# Patient Record
Sex: FEMALE | Race: WHITE
[De-identification: names, ages, dates, MRNs, and addresses within clinical notes are randomized per-mention and may not be internally consistent; named-entity substitution may affect disease eponyms.]

---

## 2017-01-07 ENCOUNTER — HOSPITAL ENCOUNTER (INPATIENT)
Dept: HOSPITAL 53 - M ED | Age: 52
LOS: 2 days | Discharge: HOME | DRG: 313 | End: 2017-01-09
Attending: INTERNAL MEDICINE | Admitting: HOSPITALIST
Payer: MEDICARE

## 2017-01-07 VITALS — BODY MASS INDEX: 55.32 KG/M2 | WEIGHT: 293 LBS | HEIGHT: 61 IN

## 2017-01-07 VITALS — SYSTOLIC BLOOD PRESSURE: 94 MMHG | DIASTOLIC BLOOD PRESSURE: 50 MMHG

## 2017-01-07 DIAGNOSIS — G62.9: ICD-10-CM

## 2017-01-07 DIAGNOSIS — Z88.1: ICD-10-CM

## 2017-01-07 DIAGNOSIS — Z95.2: ICD-10-CM

## 2017-01-07 DIAGNOSIS — N18.4: ICD-10-CM

## 2017-01-07 DIAGNOSIS — E11.9: ICD-10-CM

## 2017-01-07 DIAGNOSIS — Z88.6: ICD-10-CM

## 2017-01-07 DIAGNOSIS — Z79.02: ICD-10-CM

## 2017-01-07 DIAGNOSIS — I13.0: ICD-10-CM

## 2017-01-07 DIAGNOSIS — Z95.3: ICD-10-CM

## 2017-01-07 DIAGNOSIS — Z95.5: ICD-10-CM

## 2017-01-07 DIAGNOSIS — Z88.8: ICD-10-CM

## 2017-01-07 DIAGNOSIS — K58.2: ICD-10-CM

## 2017-01-07 DIAGNOSIS — Z85.42: ICD-10-CM

## 2017-01-07 DIAGNOSIS — G25.81: ICD-10-CM

## 2017-01-07 DIAGNOSIS — Z90.710: ICD-10-CM

## 2017-01-07 DIAGNOSIS — F41.9: ICD-10-CM

## 2017-01-07 DIAGNOSIS — Z79.899: ICD-10-CM

## 2017-01-07 DIAGNOSIS — R07.2: Primary | ICD-10-CM

## 2017-01-07 DIAGNOSIS — Z79.82: ICD-10-CM

## 2017-01-07 DIAGNOSIS — J30.9: ICD-10-CM

## 2017-01-07 DIAGNOSIS — I44.0: ICD-10-CM

## 2017-01-07 DIAGNOSIS — I50.32: ICD-10-CM

## 2017-01-07 DIAGNOSIS — G47.33: ICD-10-CM

## 2017-01-07 DIAGNOSIS — Z87.891: ICD-10-CM

## 2017-01-07 DIAGNOSIS — I42.2: ICD-10-CM

## 2017-01-07 DIAGNOSIS — Z91.19: ICD-10-CM

## 2017-01-07 DIAGNOSIS — E03.9: ICD-10-CM

## 2017-01-07 DIAGNOSIS — K43.9: ICD-10-CM

## 2017-01-07 DIAGNOSIS — E66.01: ICD-10-CM

## 2017-01-07 DIAGNOSIS — F32.9: ICD-10-CM

## 2017-01-07 LAB
ALBUMIN SERPL BCG-MCNC: 3 GM/DL (ref 3.2–5.2)
ALBUMIN/GLOB SERPL: 0.83 {RATIO} (ref 1–1.93)
ALP SERPL-CCNC: 96 U/L (ref 45–117)
ALT SERPL W P-5'-P-CCNC: 21 U/L (ref 12–78)
ANION GAP SERPL CALC-SCNC: 10 MEQ/L (ref 8–16)
AST SERPL-CCNC: 20 U/L (ref 15–37)
BASOPHILS # BLD AUTO: 0.1 K/MM3 (ref 0–0.2)
BASOPHILS NFR BLD AUTO: 0.7 % (ref 0–1)
BILIRUB CONJ SERPL-MCNC: 0.2 MG/DL (ref 0–0.2)
BILIRUB SERPL-MCNC: 0.6 MG/DL (ref 0.2–1)
BUN SERPL-MCNC: 40 MG/DL (ref 7–18)
CALCIUM SERPL-MCNC: 8.9 MG/DL (ref 8.5–10.1)
CHLORIDE SERPL-SCNC: 109 MEQ/L (ref 98–107)
CO2 SERPL-SCNC: 26 MEQ/L (ref 21–32)
CREAT SERPL-MCNC: 2.31 MG/DL (ref 0.55–1.02)
EOSINOPHIL # BLD AUTO: 0.2 K/MM3 (ref 0–0.5)
EOSINOPHIL NFR BLD AUTO: 1.5 % (ref 0–3)
ERYTHROCYTE [DISTWIDTH] IN BLOOD BY AUTOMATED COUNT: 14.3 % (ref 11.5–14.5)
GFR SERPL CREATININE-BSD FRML MDRD: 23.6 ML/MIN/{1.73_M2} (ref 51–?)
GLUCOSE SERPL-MCNC: 144 MG/DL (ref 70–105)
INR PPP: 1.14
LARGE UNSTAINED CELL #: 0.2 K/MM3 (ref 0–0.4)
LARGE UNSTAINED CELL %: 2 % (ref 0–4)
LYMPHOCYTES # BLD AUTO: 2.6 K/MM3 (ref 1.5–4.5)
LYMPHOCYTES NFR BLD AUTO: 25.6 % (ref 24–44)
MAGNESIUM SERPL-MCNC: 2 MG/DL (ref 1.8–2.4)
MCH RBC QN AUTO: 32.2 PG (ref 27–33)
MCHC RBC AUTO-ENTMCNC: 33.4 G/DL (ref 32–36.5)
MCV RBC AUTO: 96.3 FL (ref 80–96)
MONOCYTES # BLD AUTO: 0.6 K/MM3 (ref 0–0.8)
MONOCYTES NFR BLD AUTO: 6 % (ref 0–5)
NEUTROPHILS # BLD AUTO: 6.1 K/MM3 (ref 1.8–7.7)
NEUTROPHILS NFR BLD AUTO: 64.1 % (ref 36–66)
PLATELET # BLD AUTO: 158 K/MM3 (ref 150–450)
POTASSIUM SERPL-SCNC: 4.4 MEQ/L (ref 3.5–5.1)
PROT SERPL-MCNC: 6.6 GM/DL (ref 6.4–8.2)
SODIUM SERPL-SCNC: 145 MEQ/L (ref 136–145)
WBC # BLD AUTO: 9.5 K/MM3 (ref 4–10)

## 2017-01-07 RX ADMIN — ISOSORBIDE MONONITRATE SCH MG: 60 TABLET, EXTENDED RELEASE ORAL at 09:00

## 2017-01-07 RX ADMIN — MULTIPLE VITAMINS W/ MINERALS TAB SCH TAB: TAB at 09:00

## 2017-01-07 RX ADMIN — FAMOTIDINE SCH MG: 20 TABLET, FILM COATED ORAL at 20:43

## 2017-01-07 RX ADMIN — METOPROLOL SUCCINATE SCH MG: 25 TABLET, EXTENDED RELEASE ORAL at 20:24

## 2017-01-07 RX ADMIN — CLOPIDOGREL BISULFATE SCH MG: 75 TABLET ORAL at 09:00

## 2017-01-07 RX ADMIN — RANOLAZINE SCH MG: 500 TABLET, FILM COATED, EXTENDED RELEASE ORAL at 20:43

## 2017-01-07 RX ADMIN — ASPIRIN SCH MG: 81 TABLET ORAL at 09:00

## 2017-01-07 RX ADMIN — FUROSEMIDE SCH MG: 20 TABLET ORAL at 09:00

## 2017-01-07 RX ADMIN — SODIUM CHLORIDE SCH UNITS: 4.5 INJECTION, SOLUTION INTRAVENOUS at 21:03

## 2017-01-07 RX ADMIN — DOCUSATE SODIUM SCH MG: 100 CAPSULE, LIQUID FILLED ORAL at 20:43

## 2017-01-07 RX ADMIN — ONDANSETRON PRN MG: 2 INJECTION INTRAMUSCULAR; INTRAVENOUS at 23:11

## 2017-01-07 RX ADMIN — METOPROLOL SUCCINATE SCH MG: 25 TABLET, EXTENDED RELEASE ORAL at 20:41

## 2017-01-07 RX ADMIN — PREGABALIN SCH MG: 50 CAPSULE ORAL at 20:43

## 2017-01-07 NOTE — HPE
DATE OF ADMISSION:  01/07/2017

 

Time patient was seen was at 12:00 p.m.

 

PRIMARY CARE PHYSICIAN:  Dr. Do at [facility], [place], [place]

 

CARDIOLOGIST:  Dr. Collier

 

NEPHROLOGIST:  Dr. Matt

 

CHIEF COMPLAINT:  Chest pain radiating into the left arm and jaw.

 

HISTORY OF PRESENT ILLNESS:  51-year-old female with past medical history of

cardiomyopathy status post septal myectomy and bioprosthetic mitral valve

replacement.  Also, diastolic heart failure grade II, ejection fraction 60%,

hypothyroidism, restless leg syndrome, type 2 diabetes not on any medication,

endometrial cancer status post hysterectomy, subxiphoid ventral hernia,

obstructive sleep apnea noncompliant with CPAP, and irritable bowel syndrome who

presented with left sided chest pain radiating to left arm and the left jaw.  
Per

patient, it started three weeks ago and it was needle-like and can last for a

whole day.  However, no associated trouble breathing and not associated with

exertion.  Per patient, is better with pain medication and resolves around 24

hours and is worse with nitroglycerin.  She has seen her primary care for the

past few weeks and primary care believes that patient's pain is due to anxiety.

This Wednesday, about two days ago, she called cardiology and cardiology would

like her to get work up at [facility] at [place], [place].  However, she

stated that after the work up, the work up was never sent to Dr. Collier.  Last

night, the pain was worse, therefore, the patient presented herself to our

emergency room.  En route to the hospital, she was also received nitroglycerin

times two without any relief.  She also received five times of by mouth fentanyl

50 mcg.  The patient stated that that helped her and she also received Zofran 4

mg.  Otherwise, the patient denies any fever, chills, or any abdominal pain.

Admits to nausea.  Denies vomiting.  Denies any diarrhea or constipation

currently.  Denies any blood in urine or stool or any problem with urination.

 

ALLERGIES:  Patient is allergic to DOXYCYCLINE which gives her hives, KEFLEX

which gives her rash, LIPITOR gives her hives, MORPHINE per patient it turns her

blood vessel green, and her doctor told her never to take morphine again, by

mouth morphine or IV morphine she should avoid per patient, NEXIUM gives her

hives, DORMITOL makes her tongue swell.

 

HOME MEDICATIONS:

-  aspirin 81 mg one tablet by mouth daily

-  cetirizine 10 mg one tablet by mouth daily as needed

-  Plavix 75 mg one tablet by mouth daily

-  Pepcid 20 mg one tablet by mouth twice a day

-  fish oil 1000 mg one tablet by mouth daily

-  Prozac 40 mg one tablet by mouth daily

-  folic acid 800 mcg one tablet by mouth daily

-  Lasix 20 mg one tablet by mouth daily

-  isosorbide mononitrate ER 60 mg one tablet by mouth daily

-  Synthroid 25 mcg one tablet by mouth daily

-  Ativan 1 mg one tablet by mouth three times a day as needed

-  metoprolol succinate 12.5 mg one tablet by mouth twice a day

-  multivitamin one tablet by mouth daily

-  Nitrostat 0.4 mg sublingual as needed every 5 minutes

-  Lyrica 50 mg one tablet by mouth twice a day

-  Ranexa 1000 mg one tablet by mouth twice a day

-  Requip 2 mg one tablet by mouth at bedtime

 

PAST MEDICAL HISTORY:

1.  Cardiomyopathy status post two septal myectomies, also mitral valve

replacement times two, the first time was mechanical, the most recent one was

replaced with bioprosthetic in 2013.

2.  Coronary artery disease, status post two stent placement, last one was in

2016.

3.  Diastolic heart failure, ejection fraction 60-65%.

4.  Hypertensive heart disease.

5.  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

which was complicated with a mitral valve replacement.

6.  Hypothyroidism.

7.  Restless leg syndrome.

8.  Seasonal allergies.

9.  Type 2 diabetes, no longer needing medication.

10.  Endometrial cancer status post hysterectomy.

11.  Depression and anxiety.

12.  Subxiphoid ventral hernia.

13.  Obstructive sleep apnea noncompliant with CPAP for years.

14.  Irritable bowel syndrome.

15.  First degree AV block.

16.  Elevated troponin at baseline.

17.  Chronic kidney disease Stage IV.

 

PAST SURGICAL HISTORY:

1.  Mitral valve replacement times two, most recent one was bioprosthetic in

2013.

2.  Posterior tibial tendon transplant.

3.  Septal myomectomy times two.

4.  Adenoidectomy and tonsillectomy.

5.  Hysterectomy due to endometrial cancer in 2013.

6.  Cardiac stent times two, last one in 2016.

7.  Tracheostomy.

8.  Hemorrhoid repair.

 

SOCIAL HISTORY:  Patient lives at home with her parents and her son.  Denies any

smoking.  Quit nine years ago.  Smoked one pack per day for at least 20 years

prior.  Denies any drinking or recreational drug use.

 

FAMILY HISTORY:  Denies.  Patient was adopted.

 

REVIEW OF SYSTEMS:

GENERAL:  Patient denies any recent weight changes, any sick contacts, recent

traveling, fever or chills.

HEENT:  Denies any changes with vision, hearing, taste, any trouble swallowing,

or any cough.

CARDIOVASCULAR:  Patient has cardiomyopathy and coronary artery disease status

post multiple procedures.  Follows with Dr. Collier.  Admits to intermittent chest

pain for at least three weeks and also has right sided chest pain when patient

has anxiety attacks; however, this time it is on the left side.

PULMONARY:  Denies any trouble breathing.  Denies any oxygen at home.  Denies 
any

sleeping on a recliner or use of multiple pillows.  Denies any congestive heart

failure, however, she does have grade 2 diastolic heart dysfunction.

GI:  Denies any abdominal pains.  Admits to nausea.  Denies any vomiting or any

diarrhea or any constipation.  Patient however does have intermittent diarrhea

and constipation at baseline.  Denies any blood in the stool.  Patient states

that she has irritable bowel syndrome.

:  Denies any problem with urination or any blood in the urine or burning on

urination.

MUSCULOSKELETAL:  Denies any pain anywhere besides her chest currently.

ENDOCRINE:  Patient does have hypothyroidism.  Patient used to have type 2

diabetes which resolved with diet control.  Denies any heat or cold intolerance.

HEMATOLOGY/ONCOLOGY:  Denies any ease of bruising or any bleeding anywhere.

Patient did have a bleed per rectum in the past, however, resolved after

hemorrhoid surgery.  Otherwise patient also had endometrial cancer, status post

hysterectomy three years ago.

PSYCHIATRIC:  Patient has both anxiety and depression and anxiety seems to 
happen

rather frequently and gives her right sided chest pain.

NEUROLOGIC:  Denies any changes with vision, smell, hearing or taste or any

change of sensations or weakness on any one side of her body.

 

PHYSICAL EXAMINATION:

VITAL SIGNS:  Blood pressure 130/61, pulse 68, respirations 20, temperature 97.5
,

oxygen satting at 96% on room air.  Weight was 127 kg.  Height 154 cm.

GENERAL:  Patient is a morbidly obese middle aged female who was alert, awake,

oriented times three.  Does not appear to be in distress, lying comfortably in

bed with head elevated at 30 degrees.

HEENT:  Normocephalic, atraumatic.  Extraocular muscles intact.  Mucous 
membranes

moist.  Neck supple.  No lymphadenopathy.

CARDIOVASCULAR:  Regular rate and rhythm.  S1, S2.  Difficult to auscultate due

to body habitus.

LUNGS:  Clear to auscultate bilaterally.  No wheeze, rales or rhonchi.

ABDOMEN:  Positive bowel sounds.  Soft.  Nontender.  Nondistended.  No 
peritoneal

signs.  No ecchymosis.  Patient does have a ventral hernia at the subxiphoid

region; however, it does not bulge and is not tender to palpation.

EXTREMITIES:  No edema, clubbing or cyanosis.

NEUROLOGIC:  Cranial nerves II-XII intact.  No focal neurological deficit.

 

LABS:  WBC 9.5, hemoglobin 13.7, hematocrit 40.9 with platelet count of 158 and

MCV of 96.3.

 

Sodium 145, potassium 4.4, chloride 109, bicarbonate 26, BUN 40, creatinine 2.31
,

GFR 23.6, fasting glucose 144, calcium 8.9, CK 74, CK-MB 2, troponin at 2:00 
a.m.

last night was 0.16 and this morning at 8:00 a.m. was 0.16 again.  PT 14.7 and

INR 1.14.  PTT 24.1.

 

Patient had a two view chest x-ray in the emergency room, PA and lateral, which

shows mild cardiomegaly, otherwise no acute disease.

 

Patient's CT of abdomen and pelvis and also CT of chest have been ordered,

results pending.  Will followup.

 

Patient's EKG this morning shows sinus rhythm with first degree AV block and 
left

ventricular rate at 7:54 this morning was 67.

 

ASSESSMENT AND PLAN:  51-year-old female with past medical history of

cardiomyopathy status post septal myomectomy and also mitral valve replacement,

also coronary artery disease status post stent placement, morbid obesity,

hypothyroidism, hypertension, depression, and other comorbidities who presented

with:

 

1.  Chest pain that was needle-like and radiating to the left arm and jaw with

elevated troponin also.  Troponin was 0.16.  Cardiology, Dr. Sanches, was

consulted from the emergency room.  We appreciate his help.  At this point, the

patient also received an echocardiogram while patient was in the emergency room.

It was read by Dr. Sanches and it shows a second degree diastolic dysfunction 
with

ejection fraction of 60%.  At this point, will continue to trend the patient's

cardiac markers.  Rule out acute coronary syndrome (ACS).  Patient's EKG does

show first degree AV block and also left bundle branch block.  In addition, 
after

I had spoken to Dr. Sanches, he believes the patient also had an episode of

passing out, possibly has arrhythmia.  Therefore, will monitor patient on 
cardiac

telemetry and continue to monitor the patient.  Repeat EKG in the morning.

2.  EKG shows first degree AV block and left bundle branch block.  Will continue

to monitor.

3.  Elevated troponin at 0.16 times two.  Will continue to trend the cardiac

markers three more times and to see a trend to decrease.

4.  Chronic kidney disease type IV with a creatinine of 2.31 and GFR of 23.6.

Last time she was here in July, GFR was 19.  Will continue to monitor patient.

So far, patient does not have any urinary complaints.  Continue small dose of

Lasix 20 mg by mouth daily.

5.  Soft blood pressure in the emergency room with one episode of blood pressure

92/49.  Will repeat orthostatic blood pressure overnight and continue to monitor

patient.

6.  Diastolic heart failure.  Patient has an echocardiogram done this morning

read by Dr. Sanches.  Patient has an ejection fraction of 60% with grade 2

diastolic dysfunction.

7.  Hypertension.  Continue metoprolol 12.5 mg twice a day and also Imdur 60 mg

daily.  Hold if systolic blood pressure is less than 120 or if heart rate is 
less

than 65.

8.  Cardiomyopathy, status post septal myomectomy and also mitral valve

replacement with bioprosthetic valve.  Continue to monitor.  Echocardiogram this

morning did not show any abnormalities.

9.  Hypothyroidism.  Continue Synthroid.

10.  Type 2 diabetes.  Not on any medication, was diet controlled.  Continue

carbohydrate consistent diet.  Will do finger sticks in the evening and continue

to monitor patient.

11.  Restless leg syndrome.  Continue Requip.

12.  Seasonal allergies.  Continue antihistamine.

13.  Morbid obesity, stable.  Continue to monitor.

14.  Obstructive sleep apnea, noncompliant with CPAP.  Will provide as needed

oxygen at night.

15.  History of endometrial cancer status post hysterectomy.  Denies any lumps,

easy bruising or any bleeding.

16.  Anxiety and depression.  Continue home medication Prozac.

17.  Ventral hernia, subxiphoid.  Possibly causing patient's symptoms.  Will

obtain CT of abdomen and pelvis.  At the same time, will obtain CT of chest for

patient's chest pain.

18.  Irritable bowel syndrome.  Continue Colace.

19.  Deep vein thrombosis prophylaxis with heparin 5000 units subcutaneously

every 8 hours.

 

DISPOSITION:  Will continue to monitor patient on cardiac telemetry.  Continue 
to

trend troponin.  Will continue to follow with Dr. Sanches, cardiology,

recommendations.  Also, will followup with the result from CT of chest and

abdomen and pelvis.  Will repeat EKG in the morning.

 

The patient has been discussed with attending doctor, Dr. Green.

 

My preceptor for this patient encounter was Dr. Marly Green.  The preceptor was

physically present in the building during the encounter and was fully available.

As needed, all aspects of the patient interview, examination, medical decision

making process, and medical care plan development were reviewed and approved by

the preceptor.  The preceptor is aware and concurs with the plan as stated in 
the

body of this note and will attest to such by his/her cosignature.



I have both independently examined this patient as well as reviewed the note. I 
have discussed in detail with the resident the findings and plan of treatment 
as documented in the residents note. I will continue to follow the patient and 
offer further guidance to the patients care as necessary during this hospital 
stay. 



Marly WILKINS

## 2017-01-07 NOTE — EDDOCDS
Nurse's Notes                                                                                     

[facility]                                                                         

Name: Ruth Degroot                                                                           

Age: 51 yrs                                                                                       

Sex: Female                                                                                       

: 1965                                                                                   

MRN: [id number]                                                                                     

Arrival Date: 2017                                                                          

Time: 01:47                                                                                       

Account#: [id number]                                                                              

Bed Admit Hold                                                                                    

Private MD:                                                                                       

Diagnosis: Chest pain, unspecified                                                                

                                                                                                  

Presentation:                                                                                     

                                                                                             

01:56 Presenting complaint: EMS states: Chest pain radiating down left arm and into jaw for   kmg1

      approx 1.5 hours. Took Nitro without relief. Aspirin was taken PTA. Adult Sepsis            

      Screening: The patient does not have new or worsening altered mentation. Patient's          

      respiratory rate is less than 22. Systolic blood pressure is greater than 100. Patient      

      has a qSOFA score of 0- Negative Sepsis Screen. Suicide/Homicide risk assessment- the       

      patient denies having any suicidal and/or homicidal ideations and does not present with     

      any other emotional, behavioral or mental health complaints.  Status: Patient       

      is not a  or  dependent. Transition of care: patient was not          

      received from another setting of care. Care prior to arrival: See EMS report.               

      Medications administered prior to arrival: ASA, NTG.                                        

01:56 Acuity: BEE Level 2                                                                     km

01:56 Method Of Ar[place]: [place]                                                            [facility] [place]

                                                                                                  

Triage Assessment:                                                                                

02:09 General: Appears in no apparent distress, comfortable, obese, Behavior is appropriate   [facility] [place]

      for [place], cooperative, pleasant. Pain: Location: anterior aspect of left upper chest         

      Pain currently is 9 out of 10 on a pain scale. Pain radiates to left arm and neck           

      Quality of pain is described as sharp. HIV screening NA for this visit Offered              

      previously. The patient is triaged at the bedside. See Assessment in Nurses Notes           

      section of ED record. Cardiovascular: Capillary refill < 3 seconds Heart tones S1 S2        

      present Rhythm is Left BBB Chest pain is described as severe, quality is stabbing, is       

      located in left anterior radiates to left arm(s) neck episodes are continuous began 2       

      hours prior to arrival Chest pain began 2 hours ago. Respiratory: Airway is patent          

      Respiratory effort is even, unlabored, Respiratory pattern is regular, symmetrical,         

      Breath sounds are clear bilaterally. Denies shortness of breath. GI: Reports nausea.        

      Derm: Skin is pink, warm & dry.                                                           

                                                                                                  

OB/GYN:                                                                                           

02:09 LMP N/A - Hysterectomy                                                                  kmg1

                                                                                                  

Historical:                                                                                       

- Allergies: Doxycycline; Keflex (Rash); Lipitor; Morphine; Nexium; Donnatal (tongue swelling);   

- Home Meds:                                                                                      

1. aspirin 81 mg Oral chew 1 tab once daily                                                     

     (Last dose: 2017)                                                                      

2. Ativan 1 mg Oral tab 1 tab 3 times per day as needed                                         

     (Last dose: 2017 00:30)                                                                

3. Fish Oil 1,000 mg Oral cap                                                                   

     (Last dose: 2017)                                                                      

4. folic acid 800 mcg Oral tab 1 tab once daily                                                 

     (Last dose: 2017)                                                                      

5. isosorbide mononitrate 30 mg Oral Tb24 1 tab once daily                                      

     (Last dose: 2017)                                                                      

6. Lasix 20 mg Oral tab 1 tab once daily                                                        

     (Last dose: 2017)                                                                      

7. levothyroxine 25 mcg Oral tab 1 tab once daily                                               

     (Last dose: 2017)                                                                      

8. Lyrica 50 mg Oral 1 cap twice a day                                                          

     (Last dose: 2017)                                                                      

9. metoprolol succinate 25 mg Tb24 .5 tab twice a day                                           

     (Last dose: 2017)                                                                      

10. multivitamin Oral tab 1 tab daily                                                           

     (Last dose: 2017)                                                                      

11. nitroglycerin 0.4 mg SL subl 1 tab also had one by ems en route                             

     (Last dose: 2017 00:35)                                                                

12. Pepcid 20 mg Oral tab 1 tab twice a day                                                     

     (Last dose: 2017)                                                                      

13. Plavix 75 mg Oral tab 1 tab once daily                                                      

     (Last dose: 2017)                                                                      

14. Prozac 40 mg Oral cap 1 cap once daily                                                      

     (Last dose: 2017)                                                                      

15. Ranexa 1,000 mg oral Tb12 1 tab 2 times per day                                             

     (Last dose: 2017)                                                                      

16. Requip 1 mg Oral tab 1 tab nightly 1-2 tabs at HS as needed                                 

17. Zyrtec 10 mg Oral tab 1 tab once daily                                                      

     (Last dose: 2017)                                                                      

- PMHx: Anxiety; CHF; Depression; GERD; Hypercholesterolemia; Hypertension;                       

Hypothyroidism; restless leg syndrome; Seasonal Allergies; CRF; Endometrial Cancer;             

- PSHx: Valve Replacement; Post Tibial Transplant; Septomyectomy; Adenoidectomy;                  

Tonsillectomy; Hysterectomy; Cardiac stents; Tracheostomy;                                      

- Social history: Smoking status: Patient states former smoker of tobacco. No barriers            

to communication noted, The patient speaks fluent English, Speaks appropriately for             

age.                                                                                            

- Family history: No immediate family members are acutely ill.                                    

- : The pt / caregiver states he / she is on anticoagulants: Plavix. Home medication              

list is obtained from the patient, [employer] import data.                                         

- Exposure Risk Screening:: None identified.                                                      

                                                                                                  

                                                                                                  

Screenin:00 Screening information is obtained from the patient. Fall risk: No risks identified.     kmg1

      Assistance ADL's: requires no assistance with activities of daily living. Abuse/DV          

      Screen: The patient / caregiver reports he/she is: not in a situation that causes fear,     

      pain or injury. Nutritional screening: No deficits noted. Advance Directives:               

      Currently, there is a health care proxy, Pedro Singh (son). There is no active DNR order.     

      home support is adequate.                                                                   

                                                                                                  

Assessment:                                                                                       

02:00 General: See Triage Assessment.                                                         kmg1

03:00 General: Appears in no apparent distress, comfortable, Behavior is appropriate for age, kmg1

      cooperative, pleasant. Pain: Location: neck and left arm and anterior aspect of left        

      upper chest Pain currently is 3 out of 10 on a pain scale. Cardiovascular:.                 

      Cardiovascular: Rhythm is sinus arrythmia. Respiratory: Airway is patent Respiratory        

      effort is even, unlabored, Respiratory pattern is regular, symmetrical.                     

04:00 Reassessment: Patient appears in no apparent distress at this time. Patient states      kmg1

      feeling better. Patient states symptoms have improved. Resting quietly .                    

04:58 General: Appears in no apparent distress, comfortable, Behavior is appropriate for age, kmg1

      cooperative, pleasant. Pain: Location: anterior aspect of left upper chest Pain             

      currently is 7 out of 10 on a pain scale. Quality of pain is described as stabbing.         

05:59 Reassessment: Patient appears in no apparent distress at this time. Patient states      kmg1

      feeling better. Patient states symptoms have improved.                                      

07:49 General: Pt lying on stretcher. States that chest pain has recurred and feels like a    jc4 

      sharp pain in the left side of her chest and radiates into her left neck and left arm.      

      Color pink, skin warm and dry. Respirations easy and full. Dr. Ivory made aware.          

09:06 General: Pt resting on stretcher. Appears comfortable. Color pink, skin warm and dry.   jc4 

      Respirations easy and full. States that pain is improved and is currently "4/10" at         

      this time. Call bell in reach.                                                              

09:29 General: Appears in no apparent distress, comfortable, Behavior is appropriate for age, dsf 

      cooperative. Pain: Location: chest Pain currently is 7 out of 10 on a pain scale. Pain      

      does not radiate. Quality of pain is described as sharp, stabbing. Neurological: Level      

      of Consciousness is awake, alert, Oriented to person, place, time. Cardiovascular:          

      Capillary refill < 3 seconds Heart tones S1 S2 present Rhythm is sinus rhythm with 1st      

      degree heart block. Respiratory: Airway is patent Respiratory effort is even,               

      unlabored, Respiratory pattern is regular, symmetrical, Breath sounds are clear             

      bilaterally. GI: Abdomen is obese, Bowel sounds present X 4 quads. Abd is soft and non      

      tender X 4 quads. Derm: Skin is pink, warm & dry.                                         

10:28 General: Lili performing echocardiogram on patient .                                  dsf 

11:28 Adult Sepsis Screening: The patient does not have new or worsening altered mentation.   dsf 

      Patient's respiratory rate is less than 22. Systolic blood pressure is greater than         

      100. Patient has a qSOFA score of 0- Negative Sepsis Screen. General: Appears in no         

      apparent distress, comfortable, Behavior is appropriate for age, cooperative. Pain:         

      Location: chest Pain currently is 2 out of 10 on a pain scale. Quality of pain is           

      described as sharp, stabbing. Neurological: Level of Consciousness is awake, alert.         

      Cardiovascular: Capillary refill < 3 seconds Rhythm is sinus rhythm with 1st degree         

      heart block. Respiratory: Airway is patent Respiratory effort is even, unlabored,           

      Respiratory pattern is regular, symmetrical. Derm: Skin is pink, warm & dry.              

12:28 General: Appears in no apparent distress, Behavior is appropriate for age, cooperative. dsf 

      Neurological: Level of Consciousness is awake, alert. Cardiovascular: Capillary refill      

      < 3 seconds Rhythm is sinus rhythm with 1st degree heart block. Respiratory: Airway is      

      patent Respiratory effort is even, unlabored, Respiratory pattern is regular,               

      symmetrical. Derm: Skin is pink, warm & dry.                                              

12:52 General: Dr. Sanches in room examining patient .                                         dsf 

13:30 Adult Sepsis Screening: The patient does not have new or worsening altered mentation.   dsf 

      Patient's respiratory rate is less than 22. Systolic blood pressure is greater than         

      100. Patient has a qSOFA score of 0- Negative Sepsis Screen. General: Appears in no         

      apparent distress, Behavior is appropriate for age, cooperative. Neurological: Level of     

      Consciousness is awake, alert. Cardiovascular: Capillary refill < 3 seconds.                

      Respiratory: Airway is patent Respiratory effort is even, unlabored, Respiratory            

      pattern is regular, symmetrical. Derm: Skin is pink, warm & dry.                          

14:04 General: pt sitting up eating a lunch tray.                                             dsf 

14:30 General: Appears in no apparent distress, comfortable, Behavior is appropriate for age, dsf 

      cooperative, pleasant. Neurological: Level of Consciousness is awake, alert.                

      Cardiovascular: Capillary refill < 3 seconds Rhythm is sinus rhythm with 1st degree         

      heart block. Respiratory: Airway is patent Respiratory effort is even, unlabored,           

      Respiratory pattern is regular, symmetrical. Derm: Skin is pink, warm & dry.              

15:03 General: Appears in no apparent distress, comfortable, Behavior is appropriate for age, dsf 

      cooperative. Pain: Location: chest Pain currently is 6 out of 10 on a pain scale.           

      Quality of pain is described as sharp, stabbing. Neurological: Level of Consciousness       

      is awake, alert. Cardiovascular: Capillary refill < 3 seconds. Respiratory: Airway is       

      patent Respiratory effort is even, unlabored, Respiratory pattern is regular,               

      symmetrical. Derm: Skin is pink, warm & dry.                                              

16:03 General: Appears in no apparent distress, Behavior is appropriate for age, cooperative. dsf 

      Neurological: Level of Consciousness is awake, alert. Cardiovascular: Capillary refill      

      < 3 seconds. Respiratory: Airway is patent Respiratory effort is even, unlabored,           

      Respiratory pattern is regular, symmetrical. Derm: Skin is pink, warm & dry.              

17:03 Adult Sepsis Screening: The patient does not have new or worsening altered mentation.   dsf 

      Patient's respiratory rate is less than 22. Systolic blood pressure is greater than         

      100. Patient has a qSOFA score of 0- Negative Sepsis Screen. General: Appears in no         

      apparent distress, Behavior is appropriate for age, cooperative. Neurological: Level of     

      Consciousness is awake, alert. Cardiovascular: Capillary refill < 3 seconds.                

      Respiratory: Airway is patent Respiratory effort is even, unlabored, Respiratory            

      pattern is regular, symmetrical. Derm: Skin is pink, warm & dry.                          

18:03 General: Appears in no apparent distress, Behavior is appropriate for age, cooperative. dsf 

      Neurological: Level of Consciousness is awake, alert. Cardiovascular: Capillary refill      

      < 3 seconds Rhythm is sinus rhythm with 1st degree heart block. Respiratory: Airway is      

      patent Respiratory effort is even, unlabored, Respiratory pattern is regular,               

      symmetrical. Derm: Skin is pink, warm & dry.                                              

19:29 General: Appears in no apparent distress, comfortable, Behavior is cooperative. Pain:   lf1 

      Location: anterior aspect of left upper chest Pain currently is 5 out of 10 on a pain       

      scale. Pain radiates to neck and left arm. Neurological: Level of Consciousness is          

      awake, alert, Oriented to person, place, time. EENT: No deficits noted. Respiratory:        

      Respiratory effort is even, unlabored. GI: Abdomen is obese, Denies nausea, vomiting.       

      Derm: Skin is normal. Injury Description: No known injury.                                  

20:00 General:. General: Appears in no apparent distress, Behavior is cooperative.            lf1 

      Neurological: Level of Consciousness is awake, alert.                                       

                                                                                                  

Vital Signs:                                                                                      

01:57  / 71; Pulse 76; Resp 20; Temp 98.5(O); Pulse Ox 97% on R/A; Weight 127.01 kg;    ls3 

      Height 5 ft. 1 in. (154.94 cm); Pain 9/10;                                                  

05:55  / 66 (auto/);                                                                    jc4 

05:55 Pulse 70 MON; Pulse Ox 98% ;                                                            jc4 

06:10  / 85 (auto/);                                                                    jc4 

06:10 Pulse 70 MON; Pulse Ox 95% ;                                                            jc4 

06:25  / 71 (auto/);                                                                    jc4 

06:25 Pulse 70 MON; Pulse Ox 96% ;                                                            jc4 

06:40  / 75 (auto/);                                                                    jc4 

06:40 Pulse 70 MON; Pulse Ox 95% ;                                                            jc4 

06:55  / 74 (auto/);                                                                    jc4 

06:55 Pulse 70 MON; Pulse Ox 94% ;                                                            jc4 

07:10  / 77 (auto/);                                                                    jc4 

07:10 Pulse 70 MON; Pulse Ox 95% ;                                                            jc4 

07:23 Pulse 76 MON; Pulse Ox 99% ;                                                            jc4 

07:25  / 70 (auto/);                                                                    jc4 

07:40  / 61 (auto/);                                                                    jc4 

07:40 Pulse 70 MON; Pulse Ox 97% ;                                                            jc4 

07:46  / 57 (auto/);                                                                    jc4 

07:46 Pulse 68 MON; Pulse Ox 96% ;                                                            jc4 

07:49  / 57; Pulse 69; Resp 20; Pulse Ox 97% on R/A;                                    jc4 

07:55 BP 92 / 49 (auto/);                                                                     jc4 

07:55 Pulse 70 MON; Pulse Ox 97% ;                                                            jc4 

08:00 Pulse 66 MON; Pulse Ox 96% ;                                                            jc4 

08:00  / 60 (auto/);                                                                    jc4 

08:09 Pulse 68 MON; Pulse Ox 97% ;                                                            jc4 

08:10  / 65 (auto/);                                                                    jc4 

08:39 Pulse 72 MON; Pulse Ox 95% ;                                                            jc4 

08:40  / 59 (auto/);                                                                    jc4 

08:54 Pulse 70 MON; Pulse Ox 95% ;                                                            jc4 

08:55  / 75 (auto/);                                                                    jc4 

09:05 Pain 4/10;                                                                              jc4 

09:10  / 63 (auto/);                                                                    dsf 

09:10 Pulse 70 MON; Pulse Ox 93% ;                                                            dsf 

09:25  / 60 (auto/);                                                                    dsf 

09:25 Pulse 70 MON; Resp 20; Temp 97.5(TE); Pulse Ox 93% on R/A; Pain 7/10;                   dsf 

09:40  / 63 (auto/);                                                                    dsf 

09:40 Pulse 68 MON; Pulse Ox 95% ;                                                            dsf 

09:55  / 72 (auto/);                                                                    dsf 

09:55 Pulse 62 MON; Pulse Ox 93% ;                                                            dsf 

10:10  / 68 (auto/);                                                                    dsf 

10:10 Pulse 58 MON; Pulse Ox 94% ;                                                            dsf 

10:25  / 61 (auto/);                                                                    dsf 

10:25 Pulse 68 MON; Pulse Ox 96% ;                                                            dsf 

11:07 Pain 7/10;                                                                              dsf 

11:32 Pain 2/10;                                                                              dsf 

11:40  / 55 (auto/);                                                                    dsf 

11:40 Pulse 66 MON; Pulse Ox 95% ;                                                            dsf 

11:55  / 62 (auto/);                                                                    dsf 

11:55 Pulse 66 MON; Pulse Ox 96% ;                                                            dsf 

12:10  / 59 (auto/);                                                                    dsf 

12:10 Pulse 66 MON; Pulse Ox 96% ;                                                            dsf 

12:25  / 60 (auto/);                                                                    dsf 

12:25 Pulse 64 MON; Pulse Ox 96% ;                                                            dsf 

12:40  / 57 (auto/);                                                                    dsf 

12:40 Pulse 68 MON; Pulse Ox 95% ;                                                            dsf 

12:55  / 73 (auto/);                                                                    dsf 

12:55 Pulse 68 MON; Pulse Ox 94% ;                                                            dsf 

13:10 Pulse 68 MON; Pulse Ox 96% ;                                                            dsf 

13:10  / 74 (auto/); Resp 20; Temp 97.6(TE); Pain 210;                                 dsf 

13:25  / 87 (auto/);                                                                    dsf 

13:25 Pulse 66 MON; Pulse Ox 93% ;                                                            dsf 

13:40  / 62 (auto/);                                                                    dsf 

13:40 Pulse 68 MON; Pulse Ox 95% ;                                                            dsf 

13:55  / 73 (auto/);                                                                    dsf 

13:55 Pulse 70 MON; Pulse Ox 97% ;                                                            dsf 

14:25  / 67 (auto/);                                                                    dsf 

14:26 Pulse 82 MON; Pulse Ox 94% ;                                                            dsf 

16:52  / 91 (auto/);                                                                    dsf 

16:52 Pulse 76 MON; Pulse Ox 94% ;                                                            dsf 

17:07  / 77 (auto/);                                                                    dsf 

17:07 Pulse 76 MON; Pulse Ox 94% ;                                                            dsf 

17:22  / 86 (auto/);                                                                    dsf 

17:22 Pulse 78 MON; Resp 20; Temp 97.8(TE); Pulse Ox 95% on R/A; Pain 110;                   dsf 

17:36 Pulse 78 MON; Pulse Ox 94% ;                                                            lf1 

17:37  / 100 (auto/);                                                                   lf1 

17:51 Pulse 80 MON; Pulse Ox 95% ;                                                            lf1 

17:52  / 99 (auto/);                                                                    lf1 

18:05 Pulse 78 MON; Pulse Ox 94% ;                                                            lf1 

18:07  / 65 (auto/);                                                                    lf1 

18:21 Pulse 80 MON; Pulse Ox 95% ;                                                            lf1 

18:22  / 64 (auto/);                                                                    lf1 

18:47 Pulse 78 MON; Pulse Ox 94% ;                                                            lf1 

18:48  / 56 (auto/);                                                                    lf1 

18:52  / 56 (auto/);                                                                    lf1 

18:52 Pulse 78 MON; Pulse Ox 94% ;                                                            lf1 

19:07  / 70 (auto/);                                                                    lf1 

19:07 Pulse 80 MON; Pulse Ox 96% ;                                                            lf1 

19:22  / 66 (auto/);                                                                    lf1 

19:22 Pulse 82 MON; Resp 18; Temp 97.5(O); Pulse Ox 94% ; Pain 5/10;                          lf1 

19:52  / 81 (auto/);                                                                    lf1 

19:52 Pulse 86 MON; Resp 18; Pulse Ox 95% ; Pain 5/10;                                        lf1 

01:57 Body Mass Index 52.91 (127.01 kg, 154.94 cm)                                            ls3 

                                                                                                  

Vitals:                                                                                           

02:00 Refer to monitor trend for complete vital signs trends.                                 kmg1

02:09 Log In Time N/A - ambulance arrival.                                                    kmg1

                                                                                                  

ED Course:                                                                                        

01:49 Patient visited by Lopresti, Mary-Elizabeth, Unit Clerk.                                ml3 

01:49 Ana Jacome, RN is Primary Nurse.                                                   ml3 

01:49 Patient moved to Waiting                                                                ml3 

01:49 Patient moved to 17                                                                     ml3 

01:58 Triage Initiated                                                                        kmg1

02:00 The patient / caregiver is instructed regarding the plan of care and ED course. Cardiac kmg1

      monitor on. Pulse ox on. NIBP on.                                                           

02:05 EKG done. (by ED staff). Reviewed by iGllian Ivory DO.                                kb5 

02:06 Patient visited by Bancroft, Kristopher, PCA.                                           kb5 

02:15 Gillian Ivory DO is Attending Physician.                                            mm11

02:15 Patient visited by Gillian Ivory DO.                                                mm11

02:24 Patient visited by Gillian Ivory DO.                                                mm11

02:46 Inserted saline lock: 20 gauge in left forearm.                                         kmg1

02:58 Patient visited by Gillian Ivory DO.                                                mm11

03:40 Patient visited by Gillian Ivory DO.                                                mm11

04:59 Patient visited by Ana Jacome, LYN.                                                 kmg1

05:59 Patient visited by Ana Jacome, LYN.                                                 kmg1

07:17 Patient visited by Gillian Ivory DO.                                                mm11

07:24 [facility] Payment Agreement was scanned into [employer] and attached to record.               [id number]

07:29 Primary Nurse role handed off by Ana Jacome, RN                                    js13

07:54 EKG done. (by ED staff). Reviewed by Gillian Ivory DO.                                ct3 

08:01 Patient visited by Hale, Rajni, PCA.                                              ct3 

08:03 CARDIAC MARKER PANEL Sent.                                                              jc4 

08:11 Attending Physician role handed off by Gillian Ivory DO                             ml  

08:11 Lundborg-Gray, Maja, MD is Attending Physician.                                         ml  

08:41 Chest, 2 View (pa\E\lat) Returned.                                                        EDMS

09:02 Patient visited by Rajni Hale PCA.                                              ct3 

09:05 Patient visited by Siobhan Leonard RN.                                                jc4 

09:12 EKG-ADULT Returned.                                                                     EDMS

09:15 Chest, 2 View (pa\E\lat) Returned.                                                        EDMS

09:30 Patient visited by Archana Zabala RN.                                                   dsf 

10:09 Patient visited by Rajni Hale PCA.                                              ct3 

10:28 Patient visited by Archana Zabala RN.                                                   dsf 

11:30 Patient visited by Rajni Hale PCA.                                              ct3 

11:38 Peter Marly is Hospitalizing Provider.                                                      ml  

11:44 Patient visited by Archana Zabala RN.                                                   dsf 

12:46 Patient visited by Archana Zabala RN.                                                   dsf 

13:45 Patient visited by Rajni Hale PCA.                                              ct3 

13:45 Diet: consistent diet given to patient.                                                 ct3 

14:05 Patient visited by Archana Zabala RN.                                                   dsf 

14:09 Patient moved to Admit Hold                                                             kpj 

14:30 Patient visited by Archana Zabala RN.                                                   dsf 

15:04 Patient visited by Archana Zabala RN.                                                   dsf 

16:10 Patient visited by Archana Zabala RN.                                                   dsf 

16:29 CT Chest without contrast Returned.                                                     EDMS

16:29 CT Abdomen without contrast Returned.                                                   EDMS

18:29 Patient visited by Archana Zabala RN.                                                   dsf 

19:05 Patient visited by Bancroft, Kristopher, PCA.                                           kb5 

19:29 Patient visited by Migdalia West RN.                                                        lf1 

19:52 No procedures done that require assistance.                                             lf1 

                                                                                                  

Administered Medications:                                                                         

02:46 Drug: fentaNYL (PF) 50 mcg [fentanyl (PF) 50 mcg/mL injection solution (1 mL)] Route:   kmg1

      IVP; Site: left forearm;                                                                    

05:06 Drug: fentaNYL (PF) 50 mcg [fentanyl (PF) 50 mcg/mL injection solution (1 mL)] Route:   kmg1

      IVP; Site: left forearm;                                                                    

07:57 Drug: Ondansetron 4 mg [ondansetron HCl 2 mg/mL intravenous solution (2 mL)] Route:     jc4 

      IVP; Site: left antecubital;                                                                

08:12 Drug: fentaNYL (PF) 50 mcg [fentanyl (PF) 50 mcg/mL injection solution (1 mL)] Route:   jc4 

      IVP; Site: left antecubital;                                                                

09:05 Follow up: Pain 4/10 Adult                                                              jc4 

10:26 Drug: fentaNYL (PF) 50 mcg [fentanyl (PF) 50 mcg/mL injection solution (1 mL)] Route:   dsf 

      IVP; Site: left antecubital;                                                                

11:07 Follow up: Pain 7/10 Adult; see trend VS                                                dsf 

11:11 Drug: fentaNYL (PF) 50 mcg [fentanyl (PF) 50 mcg/mL injection solution (1 mL)] Route:   dsf 

      IVP; Site: left antecubital;                                                                

11:32 Follow up: Pain 2/10 Adult; see trend VS                                                dsf 

15:00 Drug: oxyCODONE-acetaminophen 1 tabs [oxycodone-acetaminophen 5 mg-325 mg tablet (1     dsf 

      tabs)] Route: PO;                                                                           

                                                                                                  

                                                                                                  

Intake:                                                                                           

                                                                                                  

Output:                                                                                           

14:39 Urine: 150.00ml (Voided); Total: 150.00ml.                                              dsf 

                                                                                                  

Order Results:                                                                                    

Lab Order: Basic Metabolic Profile; SPEC'M 17 02:27                                         

      Test: GLUCOSE, FASTING; Value: 144; Range: ; Abnormal: Above high normal; Units:      

      MG/DL; Status: F                                                                            

      Test: BLOOD UREA NITROGEN; Value: 40; Range: 7-18; Abnormal: Above high normal; Units:      

      MG/DL; Status: F                                                                            

      Test: CREATININE FOR GFR; Value: 2.31; Range: 0.55-1.02; Abnormal: Above high normal;       

      Units: MG/DL; Status: F                                                                     

      Test: GLOMERULAR FILTRATION RATE; Value: 23.6; Range: >51; Abnormal: Below low normal;      

      Status: F                                                                                   

      Test: SODIUM LEVEL; Value: 145; Range: 136-145; Units: MEQ/L; Status: F                     

      Test: POTASSIUM SERUM; Value: 4.4; Range: 3.5-5.1; Units: MEQ/L; Status: F                  

      Test: CHLORIDE LEVEL; Value: 109; Range: ; Abnormal: Above high normal; Units:        

      MEQ/L; Status: F                                                                            

      Test: CARBON DIOXIDE LEVEL; Value: 26; Range: 21-32; Units: MEQ/L; Status: F                

      Test: ANION GAP; Value: 10; Range: 8-16; Units: MEQ/L; Status: F                            

      Test: CALCIUM LEVEL; Value: 8.9; Range: 8.5-10.1; Units: MG/DL; Status: F                   

      Test Note: &nbsp;; Units are mL/min/1.73 m2 Chronic Kidney Disease Staging per NKF:       

      Stage I & II GFR >=60 Normal to Mildly Decreased Stage III GFR 30-59 Moderately           

      Decreased Stage IV GFR 15-29 Severely Decreased Stage V GFR <15 Very Little GFR Left        

      ESRD GFR <15 on RRT                                                                         

Lab Order: CBC with Diff; SPEC'M 17 02:27                                                   

      Test: WHITE BLOOD COUNT; Value: 9.5; Range: 4.0-10.0; Units: K/mm3; Status: F               

      Test: RED BLOOD COUNT; Value: 4.25; Range: 4.00-5.40; Units: M/mm3; Status: F               

      Test: HEMOGLOBIN; Value: 13.7; Range: 12.0-16.0; Units: g/dl; Status: F                     

      Test: HEMATOCRIT; Value: 40.9; Range: 36.0-47.0; Units: %; Status: F                        

      Test: MEAN CORPUSCULAR VOLUME; Value: 96.3; Range: 80.0-96.0; Abnormal: Above high          

      normal; Units: fl; Status: F                                                                

      Test: MEAN CORPUSCULAR HEMOGLOBIN; Value: 32.2; Range: 27.0-33.0; Units: pg; Status: F      

      Test: MEAN CORPUSCULAR HGB CONC; Value: 33.4; Range: 32.0-36.5; Units: g/dl; Status: F      

      Test: RED CELL DISTRIBUTION WIDTH; Value: 14.3; Range: 11.5-14.5; Units: %; Status: F       

      Test: PLATELET COUNT, AUTOMATED; Value: 158; Range: 150-450; Units: k/mm3; Status: F        

      Test: NEUTROPHILS %; Value: 64.1; Range: 36.0-66.0; Units: %; Status: F                     

      Test: LYMPH %; Value: 25.6; Range: 24.0-44.0; Units: %; Status: F                           

      Test: MONO %; Value: 6.0; Range: 0.0-5.0; Abnormal: Above high normal; Units: %;            

      Status: F                                                                                   

      Test: EOS %; Value: 1.5; Range: 0.0-3.0; Units: %; Status: F                                

      Test: BASO %; Value: 0.7; Range: 0.0-1.0; Units: %; Status: F                               

      Test: LARGE UNSTAINED CELL %; Value: 2.0; Range: 0.0-4.0; Units: %; Status: F               

      Test: NEUTROPHILS #; Value: 6.1; Range: 1.8-7.7; Units: K/mm3; Status: F                    

      Test: LYMPH #; Value: 2.6; Range: 1.5-4.5; Units: K/mm3; Status: F                          

      Test: MONO #; Value: 0.6; Range: 0.0-0.8; Units: K/mm3; Status: F                           

      Test: EOS #; Value: 0.2; Range: 0.0-0.50; Units: K/mm3; Status: F                           

      Test: BASO #; Value: 0.1; Range: 0.0-0.2; Units: K/mm3; Status: F                           

      Test: LARGE UNSTAINED CELL #; Value: 0.2; Range: 0.0-0.4; Units: K/mm3; Status: F           

Lab Order: Cardiac Injury Profile; SPEC'M 17 02:27                                          

      Test: CPK CREATINE PHOSPHOKINASE; Value: 74; Range: ; Units: U/L; Status: F           

      Test: CK-MB VALUE MASS; Value: 2.0; Range: 0.0-3.6; Units: NG/ML; Status: F                 

      Test: MB/CK RELATIVE INDEX; Value: 2.70; Range: < OR =4; Status: F                          

      Test Note: &nbsp;; DIAGNOSIS CRITERIA MMB ng/ml Relative Index (RI) NON-AMI < or = 5      

      N/A GRAY ZONE > 5 < or = 4 AMI > 5 > 4                                                      

Lab Order: Partial Thromboplastin Time; SPEC'M 17 02:27                                     

      Test: PARTIAL THROMBOPLASTIN TIME; Value: 24.1; Range: 26.6-37.1; Abnormal: Below low       

      normal; Units: SECONDS; Status: F                                                           

Lab Order: Prothrombin Time Profile\E\INR; SPEC'M 17 02:27                                  

      Test: PROTHROMBIN TIME; Value: 14.7; Range: 12.3-14.5; Abnormal: Above high normal;         

      Units: SECONDS; Status: F                                                                   

      Test: INR; Value: 1.14; Status: F                                                           

      Test Note: &nbsp;; THERAPUTIC HUMAN INR VALUES INDICATIONS NORMAL RANGES                  

      PROPHYLAXIS/TREATMENT OF: VENOUS THROMBOSIS 2.0-3.0 PULMONARY EMBOLISM 2.0-3.0              

      PREVENTION OF SYSTEMIC EMBOLISM FROM: TISSUE HEART VALVES 2.0-3.0 ACUTE MYOCARDIAL          

      INFARCTION 2.0-3.0 VALVULAR HEART DISEASE 2.0-3.0 ATRIAL FIBRILLATION 2.0-3.0               

      MECHANICAL VALVES(HIGH RISK) 2.5-3.5 RECURRENT MYOCARDIAL INFARCTION 2.5-3.5                

Lab Order: Troponin; SPEC'M 17 02:27                                                        

      Test: TROPONIN I; Value: 0.16; Range: < 0.10; Abnormal: Above high normal; Units:           

      NG/ML; Status: F                                                                            

      Test Note: &nbsp;; Troponin I Reference Interval for [employer] [place] LOCI: 99th             

      Percentile= 0.00-0.045 ng/ml Risk Stratification: <= 0.10 ng/ml Decreased Risk for          

      Adverse Clinical Events. 0.10-1.50 ng/ml Increased Risk for Adverse Clinical Events.        

      Evaluation of additional criterion and/or repeat testing in 2-6 hours is suggested to       

      rule out myocardial damage. >= 1.50 ng/ml Indicative of Myocardial Injury.                  

Lab Order: CARDIAC MARKER PANEL; SPEC'M 17 08:01                                            

      Test: CPK CREATINE PHOSPHOKINASE; Value: 59; Range: ; Units: U/L; Status: F           

      Test: CK-MB VALUE MASS; Value: 2.0; Range: 0.0-3.6; Units: NG/ML; Status: F                 

      Test: MB/CK RELATIVE INDEX; Value: 3.38; Range: < OR =4; Status: F                          

      Test: TROPONIN I; Value: 0.16; Range: < 0.10; Abnormal: Above high normal; Units:           

      NG/ML; Status: F                                                                            

      Test Note: &nbsp;; DIAGNOSIS CRITERIA MMB ng/ml Relative Index (RI) NON-AMI < or = 5      

      N/A GRAY ZONE > 5 < or = 4 AMI > 5 > 4                                                      

Lab Order: CARDIAC MARKER PANEL; SPEC 17 14:21                                            

      Test: CPK CREATINE PHOSPHOKINASE; Value: 59; Range: ; Units: U/L; Status: F           

      Test: CK-MB VALUE MASS; Value: 2.5; Range: 0.0-3.6; Units: NG/ML; Status: F                 

      Test: MB/CK RELATIVE INDEX; Value: 4.23; Range: < OR =4; Abnormal: Above high normal;       

      Status: F                                                                                   

      Test: TROPONIN I; Value: 0.15; Range: < 0.10; Abnormal: Above high normal; Units:           

      NG/ML; Status: F                                                                            

      Test Note: &nbsp;; DIAGNOSIS CRITERIA MMB ng/ml Relative Index (RI) NON-AMI < or = 5      

      N/A GRAY ZONE > 5 < or = 4 AMI > 5 > 4                                                      

Lab Order: MAGNESIUM LEVEL; SPEC 17 14:21                                                 

      Test: MAGNESIUM LEVEL; Value: 2.0; Range: 1.8-2.4; Units: MG/DL; Status: F                  

Lab Order: LIVER PROFILE; SPEC 17 14:21                                                   

      Test: AST/SGOT; Value: 20; Range: 15-37; Units: U/L; Status: F                              

      Test: ALT/SGPT; Value: 21; Range: 12-78; Units: U/L; Status: F                              

      Test: ALKALINE PHOSPHATASE; Value: 96; Range: ; Units: U/L; Status: F                 

      Test: BILIRUBIN,TOTAL; Value: 0.6; Range: 0.2-1.0; Units: MG/DL; Status: F                  

      Test: BILIRUBIN,DIRECT; Value: 0.2; Range: 0.0-0.2; Units: MG/DL; Status: F                 

      Test: TOTAL PROTEIN; Value: 6.6; Range: 6.4-8.2; Units: GM/DL; Status: F                    

      Test: ALBUMIN; Value: 3.0; Range: 3.2-5.2; Abnormal: Below low normal; Units: GM/DL;        

      Status: F                                                                                   

      Test: ALBUMIN/GLOBULIN RATIO; Value: 0.83; Range: 1.00-1.93; Abnormal: Below low            

      normal; Status: F                                                                           

                                                                                                  

Radiology Order: EKG-ADULT                                                                        

      Test: EKG-ADULT                                                                             

      REASON FOR EXAMINATION: Chest Pain; Stationary ECG Study; [facility] - ED; ;        

      Test Date: 2017; Pat Name: RUTH DEGROOT Department:; Patient ID: [id number]        

      Room: -; Gender: F Technician: QUEENIE; : 1965 Requested By: GILLIAN JUÁREZ;         

      Order Number: [id number] Reading MD: Aletha Lizama; Measurements;         

      Intervals Axis; Rate: 73 P: 21; UT: 217 QRS: -12; QRSD: 165 T: 111; QT: 473; QTc: 524;      

      Interpretive Statements; SINUS RHYTHM WITH FIRST DEGREE AV BLOCK; LEFT BUNDLE BRANCH        

      BLOCK; NO PRIOR FOR COMPARISON; Electronically Signed On 2017 8:43:43 EST by Aletha Lizama;                                                                            

Radiology Order: Chest, 2 View (pa\E\lat)                                                         

      Test: Chest, 2 View (pa\E\lat)                                                              

      REASON FOR EXAMINATION: Chest Pain; Chest x-ray: Two views.; ; History: Chest pain. No      

      comparison views.; ; Findings: The patient is status post prior median sternotomy. EKG      

      monitoring; electrodes overlie the chest. The heart is mildly enlarged. Pulmonary;          

      vasculature is not increased. Pleural angles are sharp. No significant bony;                

      abnormality is seen.; ; Impression:; ; Mild cardiomegaly. Otherwise no acute disease.;      

      ; ; Signed by; Ric Dowell MD 2017 08:47 A;                                          

Radiology Order: CT Chest without contrast                                                        

      Test: CT Chest without contrast                                                             

      REASON FOR EXAMINATION: Chest pain on left side; CT of the study of the chest without       

      contrast:; ; History: Left-sided chest pain.; ; Findings: The patient is status post        

      prior median sternotomy. There is an; epigastric ventral hernia containing a portion of     

      the left lobe of the liver.; This is seen to protrude through a defect in the upper         

      abdominal subxyphoid wall; 6.6 cm in craniocaudal by 6.5 cm in medial to lateral span.;     

      ; There is coronary stent graft and vascular calcification. A mitral valvular;              

      prosthesis is seen. Mitral annular calcification is noted. No mediastinal or; hilar         

      mass or adenopathy is observed. There is no evidence of pleural or; pericardial             

      effusion. Minimal fibrotic changes are noted in the lungs. Bone; window settings show       

      no bony destructive lesion. There are clips in the; gallbladder post cholecystectomy.       

      No adrenal lesion is observed.; ; Impression:; ; Prior median sternotomy,                   

      cholecystectomy, mitral valve replacement. Subxyphoid; epigastric hernia transmitting a     

      portion of the left lobe of the liver 6 cm in; diameter. No other significant               

      abnormality.; ; ; Signed by; Ric Dowell MD 2017 04:38 P;                            

Radiology Order: CT Abdomen without contrast                                                      

      Test: CT Abdomen without contrast                                                           

      REASON FOR EXAMINATION: ventral hernia; CT abdomen without IV or oral contrast:; ;          

      History: Ventral hernia. Comparison CT study is from 2016.; ; Findings: There       

      is no evidence of pleural effusion or ascites. The liver and; spleen are normal in size     

      and homogeneous in texture. A subxyphoid epigastric; ventral hernia is seen                 

      transmitting a 6 cm portion of the left lobe of the liver; into the anterior abdominal      

      wall. This is essentially unchanged from the  study. The patient is status     

      post mitral valve replacement. No adrenal; lesion is seen. No pancreatic abnormality is     

      noted. There are clips in the; gallbladder fossa. No renal abnormality is seen. No          

      retroperitoneal mass or; adenopathy is seen. Normal small and large bowel loops are         

      seen.; ; Impression:; ; Subxyphoid epigastric hernia transmits a 6 cm portion of the        

      left lobe of the; liver into the anterior abdominal wall essentially unchanged.             

      Postoperative; changes. No acute abdominal abnormality.; ; ; Signed by; Ric Dowell MD     

      2017 04:39 P;                                                                         

Outcome:                                                                                          

11:39 Decision to Hospitalize by Provider.                                                      

19:52 Discharge Assessment: Patient awake, alert and oriented x 3. No cognitive and/or        [facility] 

      functional deficits noted. Patient verbalized understanding of disposition                  

      instructions. The following High Risk Discharge criteria are identified: None. Admitted     

      to ICU accompanied by nurse, accompanied by tech, via stretcher, on monitor, with           

      chart. Condition: improved.                                                                 

20:12 Property :Personal belongings accompany Pt.                                             [facility] 

20:12 Discharge Assessment: patient administered narcotics - yes. Patient was admitted to the [facility] 

      hospital or transferred to another facility. CT Study completed.                            

20:12 Patient left the ED.                                                                    [id number] 

                                                                                                  

Signatures:                                                                                       

Dispatcher [employer]                           EDMS                                                 

Lundborg-Gray, Maja, MD MD   ml                                                   

Ana Jacome, RN                     RN   [id number]                                                 

Diana Ramirez, RN                       RN   j                                                  

Lopresti, NicolAnthonyMonica, Unit Clerk    Unit ml3                                                  

Bancroft, Kristopher, PCA               PCA  [id number]                                                  

Migdalia WestRN                            RN   lf1                                                  

Gillian Ivory, DO                    DO   [id number]                                                 

Siobhan Leonard, RN                    RN   [id number]                                                  

Rajni Hale, PCA                  PCA  ct3                                                  

Archana Zabala RN                       RN   dsf                                                  

Siobhan Mckeon,RN                    RN   [id number]                                                 

Ana Chase, PCA                     PCA  ls3                                                  

Tricia Iverson                                [id number]                                                 

                                                                                                  

Corrections: (The following items were deleted from the chart)                                    

01:58 01:57  / 71; Pulse 76bpm; Resp 20bpm; Pulse Ox 97% RA; Temp 98.5F Oral; 127.01    ls3 

      kg; Pain 9/10; ls3                                                                          

17:30 13:10  / 74 Auto; dsf                                                             dsf 

17:31 17:22 Pulse 78bpm; Monitor; Pulse Ox 95%; dsf                                           dsf 

                                                                                                  

**************************************************************************************************

MTDD

## 2017-01-07 NOTE — CR
DATE OF CONSULTATION: 01/07/2016

 

REFERRING PHYSICIAN:  Marly Green MD

 

INDICATION:  Chest pain in patient with established coronary artery disease and

hypertrophic cardiomyopathy.

 

HISTORY OF PRESENT ILLNESS:  Ms. Degroot is previously unknown to me, but she

has been followed by my partner Dr. Castro.  She has a very complicated medical

history.  She presented to [facility] earlier today complaining

about chest discomfort.  It is principally left-sided and retrosternal 
discomfort

that she describes as burning and occasional pressure-like, radiating towards 
her

left shoulder, left arm and left side of her jaw.  It has been present on and 
off

for several months.  She had numerous emergency room visits in [facility]; but because she was always discharged home without obvious diagnosis,

se eventually came [facility] because she was in town for a separate

reason.  The pain occurs without any obvious trigger and often last hours at a

time.

 

The patient does have established coronary artery disease.  She had a stent

placed to left anterior descending (LAD) in January 2016.  She received two

drug-eluting stents to distal LAD.  She also had a stent placed to diagonal

artery a little bit later in 2016.  Her last cardiac catheterization was in

[facility] in May 2016 and revealed patent stents and otherwise

nonobstructive three-vessel coronary artery disease.  She also carries a

diagnosis of hypertrophic cardiomyopathy, underwent septal myectomy in  [facility] in [place].  It was complicated by severe mitral insufficiency and

she underwent a redo myectomy and mitral valve replacement in [facility]

in [facility] about 8 years ago.  Unfortunately in 2014 during a robotic

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

replaced by a bioprosthesis.  At this time, the stay was complicated by

multiorgan failure and she was critically ill for long period of time.

 

PAST MEDICAL HISTORY:

1.  Type 2 diabetes.

2.  Coronary artery disease as above.

3.  Chronic renal insufficiency, stage III to IV.

4.  Obstructive sleep apnea (TERE).

5.  Morbid obesity.

6.  History of mitral valve replacement as above.

7.  History of hypertrophic cardiomyopathy.

8.  Dyslipidemia.

9.  Arterial hypertension.

 

She reports intolerance to MORPHINE NEXIUM KEFLEX DOXYCYCLINE and LIPITOR.

 

OUTPATIENT MEDICATIONS:

- Ativan one three times a day

- aspirin 81 a day

- furosemide 20 a day

- folic acid

- fish oil

- hydrocodone/acetaminophen

- isosorbide mononitrate 30 daily

- potassium 25 mcg a day

- Levemir as directed by primary care physician

- Lyrica 50 a day

- meclizine

- melatonin

- metoprolol 25 a day

- Naprosyn as needed

- Pepcid 20 mg a day

- phenazopyridine

- Plavix 75 a day

- Prozac 40 a day

- Ranexa 1000 mg twice a day

- Requip 1 mg as needed

- tramadol as needed

- vitamin C

- Zyrtec

 

SOCIAL HISTORY:  The patient lives with her son and parents.  She is .

She quit smoking in 2009.  No significant alcohol use.

 

FAMILY HISTORY:  The patient is adopted.  None of her four children have

established cardiomyopathy.

 

SURGICAL HISTORY:  Positive for bilateral ankle surgery, cholecystectomy, mitral

valve replacement as above and hysterectomy.

 

REVIEW OF SYSTEMS:  She denies any recent fever, chills, nausea, vomiting and

diarrhea.  She denies significant headaches.  She has had these chest pains for

many months without appreciable change.  Denies palpitations.  She does admit to

approximately [place] Association class II to III dyspnea.  She also reports to

numerous near syncopal episodes in upright position that usually gets resolve

when she sits down.  No bleeding problems.  No genitourinary symptoms.  No

peripheral edema.  No weakness and no skin lesions.

 

PHYSICAL EXAMINATION:  Ms. Degroot is a morbidly obese middle-aged female.

She appears comfortable.  Blood pressure 152/75, heart rate is regular in the

60s.  She is afebrile.  Saturation is in high 90s on room air.  She is alert and

oriented and appropriate.  I do not appreciate any distress.  No goiter.  
Jugular

venous distension (JVD) does not appear elevated, but it is somewhat tricky to

estimate due to her body habitus.  I do not appreciate any carotid bruits.  
Lungs

are clear to auscultation with good air movement.  Heart exam reveals regular

rhythm.  I do not appreciate any murmur, gallop or rub.  Abdomen is obese but

soft and nontender.  No hepatosplenomegaly is appreciated, but probably would 
not

be assessed due to her size.  No peripheral edema.  Peripheral pulses are good

quality.  No skin lesions.  Neurologically, she appears intact.

 

LABORATORY DATA:  She has normal complete blood count (CBC) and basic metabolic

panel reveals potassium 4.4, BUN 40, creatinine 2.34, glomerular filtration rate

(GFR) 23.  This is slightly worse than her baseline around creatinine 2 and

glucose 144.  She has three sets of cardiac enzymes.  Troponin has been 0.16.

0.16 and 0.15.  CK, CK-MB are normal.  Normal liver function tests.  Albumin is

3.0, INR is 1.1 and her electrocardiogram (ECG) reveals presence of sinus rhythm

with left bundle branch block.

 

Chest x-ray Reveals borderline cardiomegaly, but no pleural effusion and no

obvious congestive heart failure.

 

ASSESSMENT/PLAN:  Ms. Mittal is a 51-year-old morbidly obese female who has

very complicated past medical history that includes history of hypertrophic

cardiomyopathy (status post septal myectomy times two, history of mitral valve

replacement initially with a mechanical prosthesis and then reoperation with

bioprosthesis because of stent thrombosis and coronary artery disease with

history of percutaneous coronary interventions (PCIs) to distal left anterior

descending (LAD) and diagonal artery approximately one year ago.  She has been

having episodes of chest discomfort and had some features of angina-like

radiation to her shoulder and left side of her jaw, but on the other hand the

overall presentation is atypical with long duration of pain, no association with

activity.  I do believe that it is prudent to keep the patient in hospital for

observation.  I would continue her dual antiplatelet medications and low-dose

metoprolol.  Provided she rules out for myocardial infarction, which is likely,

(she had chronically mildly elevated troponin based on numerous visits in the

[facility]), then I think that she will need outpatient noninvasive

evaluation most likely with cardiac PET scan.  As far as the reported history of

near syncope is concerned, we will observe in hospital.  It sounds orthostatic 
by

her description.

 

Thank you for this consultation.

FRANKY

## 2017-01-07 NOTE — REP
CT abdomen without IV or oral contrast:

 

History:  Ventral hernia.  Comparison CT study is from July 1, 2016.

 

Findings:  There is no evidence of pleural effusion or ascites.  The liver and

spleen are normal in size and homogeneous in texture.  A subxyphoid epigastric

ventral hernia is seen transmitting a 6 cm portion of the left lobe of the liver

into the anterior abdominal wall.  This is essentially unchanged from the July 1, 2016 study.  The patient is status post mitral valve replacement.  No adrenal

lesion is seen.  No pancreatic abnormality is noted.  There are clips in the

gallbladder fossa.  No renal abnormality is seen.  No retroperitoneal mass or

adenopathy is seen.  Normal small and large bowel loops are seen.

 

Impression:

 

Subxyphoid epigastric hernia transmits a 6 cm portion of the left lobe of the

liver into the anterior abdominal wall essentially unchanged.  Postoperative

changes.  No acute abdominal abnormality.

 

 

Signed by

Ric Dowell MD 01/07/2017 04:39 P

## 2017-01-07 NOTE — ECHO
DATE: 01/07/2017

 

REFERRING PHYSICIAN:  Dr. Fine in the emergency room.

 

INDICATION:  Chest pain.  History of hypertrophic cardiomyopathy and atrial

septal ablation, history of mitral valve replacement and chronic left bundle

branch block.

 

The patient measures 61 inches and weighs 290 pounds.

 

DIMENSIONS:

 

IVS               1.2

LV                  4.9

LVPW             1.2

LA                 4.3

Aorta               2.9

Ascending aorta   2.9

RV               3.1

 

FINDINGS:

This study is of fair technical quality corresponding to patient's body habitus.

Left ventricle is of normal size and contractility.  I do not appreciate typical

septal wall motion abnormality associated his left bundle branch block.  Overall

EF around 60-65%.  Apical segments were not well seen but of overall EF is

definitely preserved.  Right ventricle is normal size as well.  Left atrium is 
at

least mildly enlarged.  Right atrium was poorly visualized but grossly appears

normal.  Aortic valve was poorly seen but there is no gross abnormality.  There

is a bioprosthetic valve in mitral position. Again based on limited 
visualization

I cannot comment much on its structure.  Tricuspid valve appears normal.

Pulmonic valve was not visualized.  No pericardial effusion is noted.  Inferior

vena cava is normal size.  Aortic root and aortic arch appear normal.  Abdominal

aorta was not seen.

 

Doppler interrogation of aortic valve reveals no stenosis or insufficiency.

There is also no gradient across LVOT.  Mitral valve prosthesis has no

insufficiency and there is minimal gradient across the valve (peak gradient 11,

mean gradient 5 mmHg).  Tricuspid valve and pulmonic valves are functionally

competent based on difficult visualization.

 

Mitral inflow pattern and tissue Doppler imaging of mitral annulus reveal grade 
2

diastolic dysfunction (tissue Doppler velocities of lateral and medial mitral

annulus are 4.8 and 4.1 cm/sec respectively).

 

CONCLUSION:

 

1.  Study is of difficult technical quality corresponding to patient's body

habitus.

2.  Normal LV size with mild LVH and normal LV systolic function.  Grade 2

diastolic dysfunction.

3.  No LVOT gradient.

4.  Bioprosthetic valve in mitral position with no apparent malfunction (mean

gradient 5 mmHg).

5.  Probably normal central venous pressure.

6.  Unable to estimate pulmonary artery pressure under.

 

COMMENT:

SBE prophylaxis is recommended.

MTDD

## 2017-01-07 NOTE — EDDOCDS
Physician Documentation                                                                           

[facility]                                                                         

Name: Ruth Degroot                                                                           

Age: 51 yrs                                                                                       

Sex: Female                                                                                       

: 1965                                                                                   

MRN: [id number]                                                                                     

Arrival Date: 2017                                                                          

Time: 01:47                                                                                       

Account#: [id number]                                                                              

Bed Admit Hold                                                                                    

Private MD:                                                                                       

Disposition:                                                                                      

17 11:39 Hospitalization ordered by Marly Green for Inpatient Admission. Preliminary            

diagnosis is Chest pain, unspecified.                                                           

- Bed requested for  ICU.                                                                        

- Status is Inpatient Admission.                                                              lf1 

- Condition is Stable.                                                                            

- Problem is new.                                                                                 

- Symptoms are unchanged.                                                                         

                                                                                                  

                                                                                                  

                                                                                                  

Historical:                                                                                       

- Allergies: Doxycycline; Keflex (Rash); Lipitor; Morphine; Nexium; Donnatal (tongue swelling);   

- Home Meds:                                                                                      

1. aspirin 81 mg Oral chew 1 tab once daily                                                     

     (Last dose: 2017)                                                                      

2. Ativan 1 mg Oral tab 1 tab 3 times per day as needed                                         

     (Last dose: 2017 00:30)                                                                

3. Fish Oil 1,000 mg Oral cap                                                                   

     (Last dose: 2017)                                                                      

4. folic acid 800 mcg Oral tab 1 tab once daily                                                 

     (Last dose: 2017)                                                                      

5. isosorbide mononitrate 30 mg Oral Tb24 1 tab once daily                                      

     (Last dose: 2017)                                                                      

6. Lasix 20 mg Oral tab 1 tab once daily                                                        

     (Last dose: 2017)                                                                      

7. levothyroxine 25 mcg Oral tab 1 tab once daily                                               

     (Last dose: 2017)                                                                      

8. Lyrica 50 mg Oral 1 cap twice a day                                                          

     (Last dose: 2017)                                                                      

9. metoprolol succinate 25 mg Tb24 .5 tab twice a day                                           

     (Last dose: 2017)                                                                      

10. multivitamin Oral tab 1 tab daily                                                           

     (Last dose: 2017)                                                                      

11. nitroglycerin 0.4 mg SL subl 1 tab also had one by ems en route                             

     (Last dose: 2017 00:35)                                                                

12. Pepcid 20 mg Oral tab 1 tab twice a day                                                     

     (Last dose: 2017)                                                                      

13. Plavix 75 mg Oral tab 1 tab once daily                                                      

     (Last dose: 2017)                                                                      

14. Prozac 40 mg Oral cap 1 cap once daily                                                      

     (Last dose: 2017)                                                                      

15. Ranexa 1,000 mg oral Tb12 1 tab 2 times per day                                             

     (Last dose: 2017)                                                                      

16. Requip 1 mg Oral tab 1 tab nightly 1-2 tabs at HS as needed                                 

17. Zyrtec 10 mg Oral tab 1 tab once daily                                                      

     (Last dose: 2017)                                                                      

- PMHx: Anxiety; CHF; Depression; GERD; Hypercholesterolemia; Hypertension;                       

Hypothyroidism; restless leg syndrome; Seasonal Allergies; CRF; Endometrial Cancer;             

- PSHx: Valve Replacement; Post Tibial Transplant; Septomyectomy; Adenoidectomy;                  

Tonsillectomy; Hysterectomy; Cardiac stents; Tracheostomy;                                      

- Social history: Smoking status: Patient states former smoker of tobacco. No barriers            

to communication noted, The patient speaks fluent English, Speaks appropriately for             

age.                                                                                            

- Family history: No immediate family members are acutely ill.                                    

- : The pt / caregiver states he / she is on anticoagulants: Plavix. Home medication              

list is obtained from the patient, [employer] import data.                                         

- Exposure Risk Screening:: None identified.                                                      

                                                                                                  

                                                                                                  

OB/GYN:                                                                                           

                                                                                             

02:09 LMP N/A - Hysterectomy                                                                  kmg1

                                                                                                  

Vital Signs:                                                                                      

01:57  / 71; Pulse 76; Resp 20; Temp 98.5(O); Pulse Ox 97% on R/A; Weight 127.01 kg /   ls3 

      280.01 lbs; Height 5 ft. 1 in. (154.94 cm); Pain 9/10;                                      

05:55  / 66 (auto/);                                                                    jc4 

05:55 Pulse 70 MON; Pulse Ox 98% ;                                                            jc4 

06:10  / 85 (auto/);                                                                    jc4 

06:10 Pulse 70 MON; Pulse Ox 95% ;                                                            jc4 

06:25  / 71 (auto/);                                                                    jc4 

06:25 Pulse 70 MON; Pulse Ox 96% ;                                                            jc4 

06:40  / 75 (auto/);                                                                    jc4 

06:40 Pulse 70 MON; Pulse Ox 95% ;                                                            jc4 

06:55  / 74 (auto/);                                                                    jc4 

06:55 Pulse 70 MON; Pulse Ox 94% ;                                                            jc4 

07:10  / 77 (auto/);                                                                    jc4 

07:10 Pulse 70 MON; Pulse Ox 95% ;                                                            jc4 

07:23 Pulse 76 MON; Pulse Ox 99% ;                                                            jc4 

07:25  / 70 (auto/);                                                                    jc4 

07:40  / 61 (auto/);                                                                    jc4 

07:40 Pulse 70 MON; Pulse Ox 97% ;                                                            jc4 

07:46  / 57 (auto/);                                                                    jc4 

07:46 Pulse 68 MON; Pulse Ox 96% ;                                                            jc4 

07:49  / 57; Pulse 69; Resp 20; Pulse Ox 97% on R/A;                                    jc4 

07:55 BP 92 / 49 (auto/);                                                                     jc4 

07:55 Pulse 70 MON; Pulse Ox 97% ;                                                            jc4 

08:00 Pulse 66 MON; Pulse Ox 96% ;                                                            jc4 

08:00  / 60 (auto/);                                                                    jc4 

08:09 Pulse 68 MON; Pulse Ox 97% ;                                                            jc4 

08:10  / 65 (auto/);                                                                    jc4 

08:39 Pulse 72 MON; Pulse Ox 95% ;                                                            jc4 

08:40  / 59 (auto/);                                                                    jc4 

08:54 Pulse 70 MON; Pulse Ox 95% ;                                                            jc4 

08:55  / 75 (auto/);                                                                    jc4 

09:05 Pain 4/10;                                                                              jc4 

09:10  / 63 (auto/);                                                                    dsf 

09:10 Pulse 70 MON; Pulse Ox 93% ;                                                            dsf 

09:25  / 60 (auto/);                                                                    dsf 

09:25 Pulse 70 MON; Resp 20; Temp 97.5(TE); Pulse Ox 93% on R/A; Pain 7/10;                   dsf 

09:40  / 63 (auto/);                                                                    dsf 

09:40 Pulse 68 MON; Pulse Ox 95% ;                                                            dsf 

09:55  / 72 (auto/);                                                                    dsf 

09:55 Pulse 62 MON; Pulse Ox 93% ;                                                            dsf 

10:10  / 68 (auto/);                                                                    dsf 

10:10 Pulse 58 MON; Pulse Ox 94% ;                                                            dsf 

10:25  / 61 (auto/);                                                                    dsf 

10:25 Pulse 68 MON; Pulse Ox 96% ;                                                            dsf 

11:07 Pain 7/10;                                                                              dsf 

11:32 Pain 2/10;                                                                              dsf 

11:40  / 55 (auto/);                                                                    dsf 

11:40 Pulse 66 MON; Pulse Ox 95% ;                                                            dsf 

11:55  / 62 (auto/);                                                                    dsf 

11:55 Pulse 66 MON; Pulse Ox 96% ;                                                            dsf 

12:10  / 59 (auto/);                                                                    dsf 

12:10 Pulse 66 MON; Pulse Ox 96% ;                                                            dsf 

12:25  / 60 (auto/);                                                                    dsf 

12:25 Pulse 64 MON; Pulse Ox 96% ;                                                            dsf 

12:40  / 57 (auto/);                                                                    dsf 

12:40 Pulse 68 MON; Pulse Ox 95% ;                                                            dsf 

12:55  / 73 (auto/);                                                                    dsf 

12:55 Pulse 68 MON; Pulse Ox 94% ;                                                            dsf 

13:10 Pulse 68 MON; Pulse Ox 96% ;                                                            dsf 

13:10  / 74 (auto/); Resp 20; Temp 97.6(TE); Pain 2/10;                                 dsf 

13:25  / 87 (auto/);                                                                    dsf 

13:25 Pulse 66 MON; Pulse Ox 93% ;                                                            dsf 

13:40  / 62 (auto/);                                                                    dsf 

13:40 Pulse 68 MON; Pulse Ox 95% ;                                                            dsf 

13:55  / 73 (auto/);                                                                    dsf 

13:55 Pulse 70 MON; Pulse Ox 97% ;                                                            dsf 

14:25  / 67 (auto/);                                                                    dsf 

14:26 Pulse 82 MON; Pulse Ox 94% ;                                                            dsf 

16:52  / 91 (auto/);                                                                    dsf 

16:52 Pulse 76 MON; Pulse Ox 94% ;                                                            dsf 

17:07  / 77 (auto/);                                                                    dsf 

17:07 Pulse 76 MON; Pulse Ox 94% ;                                                            dsf 

17:22  / 86 (auto/);                                                                    dsf 

17:22 Pulse 78 MON; Resp 20; Temp 97.8(TE); Pulse Ox 95% on R/A; Pain 110;                   dsf 

17:36 Pulse 78 MON; Pulse Ox 94% ;                                                            lf1 

17:37  / 100 (auto/);                                                                   lf1 

17:51 Pulse 80 MON; Pulse Ox 95% ;                                                            lf1 

17:52  / 99 (auto/);                                                                    lf1 

18:05 Pulse 78 MON; Pulse Ox 94% ;                                                            lf1 

18:07  / 65 (auto/);                                                                    lf1 

18:21 Pulse 80 MON; Pulse Ox 95% ;                                                            lf1 

18:22  / 64 (auto/);                                                                    lf1 

18:47 Pulse 78 MON; Pulse Ox 94% ;                                                            lf1 

18:48  / 56 (auto/);                                                                    lf1 

18:52  / 56 (auto/);                                                                    lf1 

18:52 Pulse 78 MON; Pulse Ox 94% ;                                                            lf1 

19:07  / 70 (auto/);                                                                    lf1 

19:07 Pulse 80 MON; Pulse Ox 96% ;                                                            lf1 

19:22  / 66 (auto/);                                                                    lf1 

19:22 Pulse 82 MON; Resp 18; Temp 97.5(O); Pulse Ox 94% ; Pain 5/10;                          lf1 

19:52  / 81 (auto/);                                                                    lf1 

19:52 Pulse 86 MON; Resp 18; Pulse Ox 95% ; Pain 5/10;                                        lf1 

01:57 Body Mass Index 52.91 (127.01 kg, 154.94 cm)                                            ls3 

                                                                                                  

MDM:                                                                                              

02:00 ECG WITH READING ER PHYS+CARDIAG ordered.                                               EDMS

02:25 Cardiac Monitor/Pulse Ox/q 30 min VS ordered.                                           mm11

02:25 IV Saline Lock ordered.                                                                 mm11

02:25 Rhythm Strip to chart ordered.                                                          mm11

02:25 Undress patient appropriately for examination ordered.                                  mm11

02:25 fentaNYL (PF) 50 mcg IVP once ordered.                                                  mm11

02:26 Basic Metabolic Profile Ordered.                                                        EDMS

02:26 CBC with Diff Ordered.                                                                  EDMS

02:26 Cardiac Injury Profile Ordered.                                                         EDMS

02:26 Partial Thromboplastin Time Ordered.                                                    EDMS

02:26 Prothrombin Time Profile\E\INR Ordered.                                                   EDMS

02:26 Troponin Ordered.                                                                       EDMS

02:26 Chest, 2 View (pa\E\lat) Ordered.                                                         EDMS

02:58 Basic Metabolic Profile Reviewed.                                                       mm11

02:58 CBC with Diff Reviewed.                                                                 mm11

02:58 Partial Thromboplastin Time Reviewed.                                                   mm11

02:58 Prothrombin Time Profile\E\INR Reviewed.                                                  mm11

02:58 Troponin Reviewed.                                                                      mm11

02:58 Cardiac Injury Profile Reviewed.                                                        mm11

03:39 Redraw CIP &Troponin (put time in details section) ordered.                             mm11

03:39 Repeat EKG (put time details section) ordered.                                          mm11

03:40 Repeat EKG (put time details section) complete.                                         ml3 

03:40 Redraw CIP &Troponin (put time in details section) complete.                            ml3

03:41 ECG WITH READING ER PHYS ordered.                                                       EDMS

03:42 CARDIAC MARKER PANEL Ordered.                                                           EDMS

05:03 fentaNYL (PF) 50 mcg IVP once ordered.                                                  mm11

07:24 [facility] Payment Agreement was scanned into [facility] and attached to record.               [id number]

07:25 Financial registration complete.                                                        [id number]

07:49 Ondansetron 4 mg IVP once ordered.                                                      ml  

08:03 fentaNYL (PF) 50 mcg IVP once ordered.                                                  ml  

08:38 CARDIAC MARKER PANEL Reviewed.                                                          ml  

08:43 Chest, 2 View (pa\E\lat) Reviewed.                                                        ml

09:08 BED REQUEST+ADM ordered.                                                                EDMS

09:08 ECHOCARDIOGRAM,DOPPLER/COLOR FLOW+CARDIAG ordered.                                      EDMS

10:20 fentaNYL (PF) 50 mcg IVP once ordered.                                                  ml  

11:07 fentaNYL (PF) 50 mcg IVP once ordered.                                                  dsf 

12:28 CT Chest without contrast Ordered.                                                      EDMS

12:29 CT Abdomen without contrast Ordered.                                                    EDMS

13:14 CARDIAC MARKER PANEL Ordered.                                                           EDMS

13:15 CARDIAC MARKER PANEL Ordered.                                                           EDMS

13:15 MAGNESIUM LEVEL Ordered.                                                                EDMS

13:15 LIVER PROFILE Ordered.                                                                  EDMS

13:21 ELECTROCARDIOGRAM ADULT ordered.                                                        EDMS

13:21 CONSISTENT CARBOHYDRATES ordered.                                                       EDMS

13:28 Admission / Observation Status ordered.                                                 EDMS

13:29 PHYSICAL THERAPY EVAL & TREAT ordered.                                                  EDMS

15:02 oxyCODONE-acetaminophen 5 mg-325 mg 1 tabs PO once; admission order ordered.            dsf 

19:30 CARDIAC MARKER PANEL Ordered.                                                           EDMS

19:31 CBC WITH DIFFERENTIAL Ordered.                                                          EDMS

19:31 BASIC METABOLIC PROFILE Ordered.                                                        EDMS

                                                                                                  

Administered Medications:                                                                         

02:46 Drug: fentaNYL (PF) 50 mcg [fentanyl (PF) 50 mcg/mL injection solution (1 mL)] Route:   kmg1

      IVP; Site: left forearm;                                                                    

05:06 Drug: fentaNYL (PF) 50 mcg [fentanyl (PF) 50 mcg/mL injection solution (1 mL)] Route:   kmg1

      IVP; Site: left forearm;                                                                    

07:57 Drug: Ondansetron 4 mg [ondansetron HCl 2 mg/mL intravenous solution (2 mL)] Route:     jc4 

      IVP; Site: left antecubital;                                                                

08:12 Drug: fentaNYL (PF) 50 mcg [fentanyl (PF) 50 mcg/mL injection solution (1 mL)] Route:   jc4 

      IVP; Site: left antecubital;                                                                

09:05 Follow up: Pain /10 Adult                                                              jc4 

10:26 Drug: fentaNYL (PF) 50 mcg [fentanyl (PF) 50 mcg/mL injection solution (1 mL)] Route:   dsf 

      IVP; Site: left antecubital;                                                                

11:07 Follow up: Pain 7/10 Adult; see trend VS                                                dsf 

11:11 Drug: fentaNYL (PF) 50 mcg [fentanyl (PF) 50 mcg/mL injection solution (1 mL)] Route:   dsf 

      IVP; Site: left antecubital;                                                                

11:32 Follow up: Pain 2/10 Adult; see trend VS                                                dsf 

15:00 Drug: oxyCODONE-acetaminophen 1 tabs [oxycodone-acetaminophen 5 mg-325 mg tablet (1     dsf 

      tabs)] Route: PO;                                                                           

                                                                                                  

                                                                                                  

Signatures:                                                                                       

Dispatcher [employer]                           EDMS                                                 

Lundborg-Gray, Maja, MD MD   ml                                                   

Ana Jacome, LYN                     RN   [id number]                                                 

Lopresti, Mary-Elizabeth, Unit Clerk    Unit ml3                                                  

Migdalia West RN                            RN   [id number]                                                  

Rogers Ivory,                     DO   [id number]                                                 

Siobhan Leonard RN RN   [id number]                                                  

Archana Zabala RN RN dsf                                                  

Tricia Iverson                                [id number]                                                 

                                                                                                  

The chart was reviewed and I authenticate all verbal orders and agree with the evaluation and 
treatment provided.Attachments:

07:24 [facility] Payment Agreement                                                                [id number]

                                                                                                  

**************************************************************************************************

MTDD

## 2017-01-07 NOTE — ECGEPIP
Stationary ECG Study

                           [facility] - ED

                                       

                                       Test Date:    2017

Pat Name:     RENATO OCHOA        Department:   

Patient ID:   [id number]                 Room:         -

Gender:       F                        Technician:   QUEENIE

:          1965               Requested By: GILLIAN JUÁREZ

Order Number: [id number]     Reading MD:   Aletha Lizama

                                 Measurements

Intervals                              Axis          

Rate:         73                       P:            21

VA:           217                      QRS:          -12

QRSD:         165                      T:            111

QT:           473                                    

QTc:          524                                    

                           Interpretive Statements

SINUS RHYTHM WITH FIRST DEGREE AV BLOCK

LEFT BUNDLE BRANCH BLOCK

NO PRIOR FOR COMPARISON

Electronically Signed On 2017 8:43:43 EST by Aletha Lizama

## 2017-01-07 NOTE — ECGEPIP
Stationary ECG Study

                           [facility] - ED

                                       

                                       Test Date:    2017

Pat Name:     RENATO OCHOA        Department:   

Patient ID:   [id number]                 Room:         -

Gender:       F                        Technician:   Ct

:          1965               Requested By: GILLIAN JUÁREZ

Order Number: [id number]     Reading MD:   Eldon Sainz

                                 Measurements

Intervals                              Axis          

Rate:         67                       P:            26

KS:           230                      QRS:          -5

QRSD:         159                      T:            125

QT:           498                                    

QTc:          526                                    

                           Interpretive Statements

SINUS RHYTHM WITH FIRST DEGREE AV BLOCK

LEFT BUNDLE BRANCH BLOCK

 

Electronically Signed On 2017 22:58:43 EST by Eldon Sainz

## 2017-01-07 NOTE — REP
CT of the study of the chest without contrast:

 

History:  Left-sided chest pain.

 

Findings:  The patient is status post prior median sternotomy.  There is an

epigastric ventral hernia containing a portion of the left lobe of the liver.

This is seen to protrude through a defect in the upper abdominal subxyphoid wall

6.6 cm in craniocaudal by 6.5 cm in medial to lateral span.

 

There is coronary stent graft and vascular calcification.  A mitral valvular

prosthesis is seen.  Mitral annular calcification is noted.  No mediastinal or

hilar mass or adenopathy is observed.  There is no evidence of pleural or

pericardial effusion.  Minimal fibrotic changes are noted in the lungs.  Bone

window settings show no bony destructive lesion.  There are clips in the

gallbladder post cholecystectomy.  No adrenal lesion is observed.

 

Impression:

 

Prior median sternotomy, cholecystectomy, mitral valve replacement.  Subxyphoid

epigastric hernia transmitting a portion of the left lobe of the liver 6 cm in

diameter.  No other significant abnormality.

 

 

Signed by

Ric Dowell MD 01/07/2017 04:38 P

## 2017-01-08 VITALS — DIASTOLIC BLOOD PRESSURE: 59 MMHG | SYSTOLIC BLOOD PRESSURE: 97 MMHG

## 2017-01-08 VITALS — SYSTOLIC BLOOD PRESSURE: 91 MMHG | DIASTOLIC BLOOD PRESSURE: 52 MMHG

## 2017-01-08 VITALS — SYSTOLIC BLOOD PRESSURE: 100 MMHG | DIASTOLIC BLOOD PRESSURE: 59 MMHG

## 2017-01-08 VITALS — SYSTOLIC BLOOD PRESSURE: 120 MMHG | DIASTOLIC BLOOD PRESSURE: 55 MMHG | HEART RATE: 74 BPM

## 2017-01-08 VITALS — DIASTOLIC BLOOD PRESSURE: 59 MMHG | HEART RATE: 78 BPM | SYSTOLIC BLOOD PRESSURE: 97 MMHG

## 2017-01-08 VITALS — SYSTOLIC BLOOD PRESSURE: 96 MMHG | DIASTOLIC BLOOD PRESSURE: 53 MMHG | HEART RATE: 78 BPM

## 2017-01-08 VITALS — SYSTOLIC BLOOD PRESSURE: 101 MMHG | DIASTOLIC BLOOD PRESSURE: 80 MMHG

## 2017-01-08 LAB
ANION GAP SERPL CALC-SCNC: 7 MEQ/L (ref 8–16)
BASOPHILS # BLD AUTO: 0.1 K/MM3 (ref 0–0.2)
BASOPHILS NFR BLD AUTO: 1 % (ref 0–1)
BUN SERPL-MCNC: 36 MG/DL (ref 7–18)
CALCIUM SERPL-MCNC: 9 MG/DL (ref 8.5–10.1)
CHLORIDE SERPL-SCNC: 109 MEQ/L (ref 98–107)
CO2 SERPL-SCNC: 28 MEQ/L (ref 21–32)
CREAT SERPL-MCNC: 2.3 MG/DL (ref 0.55–1.02)
EOSINOPHIL # BLD AUTO: 0.2 K/MM3 (ref 0–0.5)
EOSINOPHIL NFR BLD AUTO: 2.6 % (ref 0–3)
ERYTHROCYTE [DISTWIDTH] IN BLOOD BY AUTOMATED COUNT: 14.4 % (ref 11.5–14.5)
GFR SERPL CREATININE-BSD FRML MDRD: 23.7 ML/MIN/{1.73_M2} (ref 51–?)
GLUCOSE SERPL-MCNC: 114 MG/DL (ref 70–105)
LARGE UNSTAINED CELL #: 0.2 K/MM3 (ref 0–0.4)
LARGE UNSTAINED CELL %: 2.5 % (ref 0–4)
LYMPHOCYTES # BLD AUTO: 2.5 K/MM3 (ref 1.5–4.5)
LYMPHOCYTES NFR BLD AUTO: 32.3 % (ref 24–44)
MCH RBC QN AUTO: 32.4 PG (ref 27–33)
MCHC RBC AUTO-ENTMCNC: 32.3 G/DL (ref 32–36.5)
MCV RBC AUTO: 100.3 FL (ref 80–96)
MONOCYTES # BLD AUTO: 0.3 K/MM3 (ref 0–0.8)
MONOCYTES NFR BLD AUTO: 4.8 % (ref 0–5)
NEUTROPHILS # BLD AUTO: 4 K/MM3 (ref 1.8–7.7)
NEUTROPHILS NFR BLD AUTO: 56.7 % (ref 36–66)
PLATELET # BLD AUTO: 164 K/MM3 (ref 150–450)
POTASSIUM SERPL-SCNC: 4.4 MEQ/L (ref 3.5–5.1)
SODIUM SERPL-SCNC: 144 MEQ/L (ref 136–145)
WBC # BLD AUTO: 7.1 K/MM3 (ref 4–10)

## 2017-01-08 RX ADMIN — ONDANSETRON PRN MG: 2 INJECTION INTRAMUSCULAR; INTRAVENOUS at 19:48

## 2017-01-08 RX ADMIN — SODIUM CHLORIDE SCH UNITS: 4.5 INJECTION, SOLUTION INTRAVENOUS at 14:11

## 2017-01-08 RX ADMIN — METOPROLOL SUCCINATE SCH MG: 25 TABLET, EXTENDED RELEASE ORAL at 20:07

## 2017-01-08 RX ADMIN — ISOSORBIDE MONONITRATE SCH MG: 60 TABLET, EXTENDED RELEASE ORAL at 08:03

## 2017-01-08 RX ADMIN — SODIUM CHLORIDE SCH UNITS: 4.5 INJECTION, SOLUTION INTRAVENOUS at 05:19

## 2017-01-08 RX ADMIN — FAMOTIDINE SCH MG: 20 TABLET, FILM COATED ORAL at 08:03

## 2017-01-08 RX ADMIN — SODIUM CHLORIDE SCH UNITS: 4.5 INJECTION, SOLUTION INTRAVENOUS at 21:13

## 2017-01-08 RX ADMIN — RANOLAZINE SCH MG: 500 TABLET, FILM COATED, EXTENDED RELEASE ORAL at 08:02

## 2017-01-08 RX ADMIN — PREGABALIN SCH MG: 50 CAPSULE ORAL at 20:06

## 2017-01-08 RX ADMIN — CLOPIDOGREL BISULFATE SCH MG: 75 TABLET ORAL at 08:02

## 2017-01-08 RX ADMIN — FUROSEMIDE SCH MG: 20 TABLET ORAL at 08:02

## 2017-01-08 RX ADMIN — ASPIRIN SCH MG: 81 TABLET ORAL at 08:02

## 2017-01-08 RX ADMIN — RANOLAZINE SCH MG: 500 TABLET, FILM COATED, EXTENDED RELEASE ORAL at 20:06

## 2017-01-08 RX ADMIN — MULTIPLE VITAMINS W/ MINERALS TAB SCH TAB: TAB at 08:03

## 2017-01-08 RX ADMIN — FAMOTIDINE SCH MG: 20 TABLET, FILM COATED ORAL at 20:06

## 2017-01-08 RX ADMIN — LEVOTHYROXINE SODIUM SCH MG: 25 TABLET ORAL at 05:19

## 2017-01-08 RX ADMIN — DOCUSATE SODIUM SCH MG: 100 CAPSULE, LIQUID FILLED ORAL at 20:06

## 2017-01-08 RX ADMIN — PREGABALIN SCH MG: 50 CAPSULE ORAL at 08:02

## 2017-01-08 RX ADMIN — DOCUSATE SODIUM SCH MG: 100 CAPSULE, LIQUID FILLED ORAL at 08:02

## 2017-01-08 NOTE — IPN
DATE:  01/08/2017

 

Mrs. Degroot had another episode of chest discomfort last night and then again

one this morning.  They both were relieved by administration of narcotics. In the

interim, she feels reasonably well.  She still gets short of breath with

activity.

 

Vital Signs: Blood pressure 97/57.  Heart rate is in 70s.  She is afebrile.

Saturation is 97% on room air.  Fluid balance approximately equal.  She is alert

and oriented and appropriate.  I do not appreciate any neurologic deficit.

Jugular venous pulse (JVP) is very difficult to estimate due to her body habitus.

Lungs are clear with good air movement.  Heart exam regular rhythm.  I do not

appreciate any gallop, rub or murmur, but due to her body habitus the heart

sounds are rather muffled.  Abdomen is obese, but otherwise soft.  There is no

peripheral edema.  Peripheral pulses are palpable.  Neurologically, she is

intact.

 

Basic metabolic panel:  Potassium 4.4, BUN 36, creatinine 2.3 and glucose 114.

Troponin is 0.12.  Her CBC is normal.

 

ASSESSMENT/PLAN:  Mrs. Degroot is a very complicated patient.  She is a

51-year-old female with history of hypertrophic cardiomyopathy status post septal

myectomy times two, history of mitral valve replacement with bioprosthesis (after

thrombosing mechanical prosthesis previously) and established coronary artery

disease with history of PCI to distal LAD and diagonal approximately 1 year ago.

She presented with very atypical chest discomfort.  She has marginally elevated

troponin which has been the case for a very long period of time.  At this point,

I believe she can be discharged home probably tomorrow as her blood pressure is

rather soft and she had episode of pain just this morning.  I will arrange for

outpatient nuclear stress test, probably for outpatient cardiac PET scan, as she

will need to have evaluation for ischemia.  My suspicion is relatively low.  As a

second concern, she had a four beat run of nonsustained ventricular tachycardia.

It was asymptomatic.  Again, the principal intervention is continuation of beta

blockers and evaluation for ischemia.

## 2017-01-09 VITALS — DIASTOLIC BLOOD PRESSURE: 70 MMHG | SYSTOLIC BLOOD PRESSURE: 121 MMHG

## 2017-01-09 VITALS — SYSTOLIC BLOOD PRESSURE: 104 MMHG | DIASTOLIC BLOOD PRESSURE: 51 MMHG

## 2017-01-09 VITALS — DIASTOLIC BLOOD PRESSURE: 58 MMHG | SYSTOLIC BLOOD PRESSURE: 93 MMHG

## 2017-01-09 LAB
ANION GAP SERPL CALC-SCNC: 11 MEQ/L (ref 8–16)
BASOPHILS # BLD AUTO: 0 K/MM3 (ref 0–0.2)
BASOPHILS NFR BLD AUTO: 0.3 % (ref 0–1)
BUN SERPL-MCNC: 32 MG/DL (ref 7–18)
CALCIUM SERPL-MCNC: 8.2 MG/DL (ref 8.5–10.1)
CHLORIDE SERPL-SCNC: 109 MEQ/L (ref 98–107)
CO2 SERPL-SCNC: 25 MEQ/L (ref 21–32)
CREAT SERPL-MCNC: 2.17 MG/DL (ref 0.55–1.02)
EOSINOPHIL # BLD AUTO: 0.1 K/MM3 (ref 0–0.5)
EOSINOPHIL NFR BLD AUTO: 1.6 % (ref 0–3)
ERYTHROCYTE [DISTWIDTH] IN BLOOD BY AUTOMATED COUNT: 13.6 % (ref 11.5–14.5)
GFR SERPL CREATININE-BSD FRML MDRD: 25.4 ML/MIN/{1.73_M2} (ref 51–?)
GLUCOSE SERPL-MCNC: 122 MG/DL (ref 70–105)
LARGE UNSTAINED CELL #: 0.2 K/MM3 (ref 0–0.4)
LARGE UNSTAINED CELL %: 2.9 % (ref 0–4)
LYMPHOCYTES # BLD AUTO: 1.7 K/MM3 (ref 1.5–4.5)
LYMPHOCYTES NFR BLD AUTO: 31.9 % (ref 24–44)
MCH RBC QN AUTO: 32.5 PG (ref 27–33)
MCHC RBC AUTO-ENTMCNC: 33.3 G/DL (ref 32–36.5)
MCV RBC AUTO: 97.5 FL (ref 80–96)
MONOCYTES # BLD AUTO: 0.3 K/MM3 (ref 0–0.8)
MONOCYTES NFR BLD AUTO: 5.3 % (ref 0–5)
NEUTROPHILS # BLD AUTO: 3.1 K/MM3 (ref 1.8–7.7)
NEUTROPHILS NFR BLD AUTO: 58 % (ref 36–66)
PLATELET # BLD AUTO: 126 K/MM3 (ref 150–450)
POTASSIUM SERPL-SCNC: 4.2 MEQ/L (ref 3.5–5.1)
SODIUM SERPL-SCNC: 145 MEQ/L (ref 136–145)
WBC # BLD AUTO: 5.4 K/MM3 (ref 4–10)

## 2017-01-09 RX ADMIN — ISOSORBIDE MONONITRATE SCH MG: 60 TABLET, EXTENDED RELEASE ORAL at 08:54

## 2017-01-09 RX ADMIN — ASPIRIN SCH MG: 81 TABLET ORAL at 08:55

## 2017-01-09 RX ADMIN — METOPROLOL SUCCINATE SCH MG: 25 TABLET, EXTENDED RELEASE ORAL at 08:53

## 2017-01-09 RX ADMIN — CLOPIDOGREL BISULFATE SCH MG: 75 TABLET ORAL at 08:55

## 2017-01-09 RX ADMIN — RANOLAZINE SCH MG: 500 TABLET, FILM COATED, EXTENDED RELEASE ORAL at 08:54

## 2017-01-09 RX ADMIN — SODIUM CHLORIDE SCH UNITS: 4.5 INJECTION, SOLUTION INTRAVENOUS at 05:06

## 2017-01-09 RX ADMIN — PREGABALIN SCH MG: 50 CAPSULE ORAL at 08:54

## 2017-01-09 RX ADMIN — LEVOTHYROXINE SODIUM SCH MG: 25 TABLET ORAL at 05:06

## 2017-01-09 RX ADMIN — FUROSEMIDE SCH MG: 20 TABLET ORAL at 08:55

## 2017-01-09 RX ADMIN — DOCUSATE SODIUM SCH MG: 100 CAPSULE, LIQUID FILLED ORAL at 08:54

## 2017-01-09 RX ADMIN — ONDANSETRON PRN MG: 2 INJECTION INTRAMUSCULAR; INTRAVENOUS at 08:42

## 2017-01-09 RX ADMIN — MULTIPLE VITAMINS W/ MINERALS TAB SCH TAB: TAB at 08:53

## 2017-01-09 RX ADMIN — SODIUM CHLORIDE SCH UNITS: 4.5 INJECTION, SOLUTION INTRAVENOUS at 14:48

## 2017-01-09 RX ADMIN — FAMOTIDINE SCH MG: 20 TABLET, FILM COATED ORAL at 08:54

## 2017-01-09 NOTE — ECGEPIP
Stationary ECG Study

                              [facility]

                                       

                                       Test Date:    2017

Pat Name:     RENATO OCHOA        Department:   

Patient ID:   [id number]                 Room:         [place] [address]

Gender:       F                        Technician:   AR

:          1965               Requested By: MAGALYS PICHARDO 

Order Number: [id number]     Reading MD:   Patrick Berumen

                                 Measurements

Intervals                              Axis          

Rate:         78                       P:            52

ME:           197                      QRS:          -10

QRSD:         151                      T:            136

QT:           437                                    

QTc:          498                                    

                           Interpretive Statements

SINUS RHYTHM

LEFT BUNDLE BRANCH BLOCK

 

 

Electronically Signed On 2017 20:20:53 EST by Patrick Berumen

## 2017-01-09 NOTE — IPN
DATE:   01/08/2017

 

SUBJECTIVE:  The patient had another episode of chest pain last night and again

this morning, both of which responded to narcotics.  She feels reasonably well.

Gets short of breath with activity.  Denies any cough or phlegm.  Denied any

abdominal pain, nausea, vomiting, or diarrhea.  Does have soft blood pressures

but not feeling any dizziness.

 

PHYSICAL EXAMINATION:

VITAL SIGNS:  Blood pressure 96/50, pulse 76, respiratory rate 18, pulse 
oximetry

97% on room air.  Temperature 98.1.

GENERAL:  Patient is awake, alert and oriented.  Morbidly obese.  Lying down in

bed in no acute distress.

HEENT:  Normocephalic, atraumatic.  Moist mucous membranes.  Anicteric eyes.

CHEST: Overall decreased breath sounds heard but symmetrical.  Clear and good 
air

movement.

CARDIOVASCULAR:  S1, S2.  Regular.  No rub, murmur or gallop.

ABDOMEN: Obese, soft, nontender.  Bowel sounds present.

EXTREMITIES:  No edema.

 

LABORATORY DATA:

WBC 7.1, hemoglobin 13.3, platelets 164.  Sodium 144, potassium 4.4, chloride

109, bicarbonate 28, BUN 36, creatinine 2.3, glucose 114, troponin 0.12.

 

ASSESSMENT AND PLAN:  This is a 51-year-old female with past medical history of

hypertrophic cardiomyopathy, status post septal myectomy with  history of mitral

regurgitation, status post initially metallic mitral valve followed

by artificial valve thrombus, followed by replacement of the mitral valve by

bioprosthetic mitral valve, diastolic congestive heart failure (CHF) with

ejection fraction of 60%, diabetes, hypothyroidism, restless leg syndrome, 
morbid

obesity, obstructive sleep apnea, epigastric hernia, presented with chronic

intermittent chest pain for about 1 to 2 months.

 

PLAN:

1.  Chronic chest pain.  As per cardiology, etiology of the pain for cardiac is

low at this point.  However, the patient will need workup with PET CT scan in

view of her extensive cardiac history.  The patient had a recent stent placement

in January 2016, followed by coronary angiogram in May 2016, which showed that

the stents are patent.  The patient may also need a stress test as an outpatient

at this point.  Further evaluation with coronary angiogram is not pursued in 
view

of chronic kidney disease stage IV and most recent angiogram about six months

ago. The patient is responding well to narcotics and so has been given as needed

narcotics with good response.

 

2.  Coronary artery disease with history of stents to the distal LAD and 
diagonal

in January 2016 with marginally elevated troponin for a long time.  Stable at

this point.  Does not seem to be having any acute cardiac events at this point.

 

3.  Mitral valve replacement with prosthetic mitral valve, stable at this point.

 

 

4.  History of hypertrophic cardiomyopathy, status post myectomy times two.  No

complaints.  For chest pain, we will continue with beta blocker.  Will need

further workup for ischemia as an outpatient.  Continue with aspirin,

clopidogrel, and statin.  Also, patient prescribed as needed nitroglycerin and

Ranexa.

 

5.  Hyperlipidemia.  Continue with lovastatin.

 

6.  Hypothyroidism.  Continue with Synthroid.

 

7.  Possible neuropathy.  Continue with Lyrica.

 

8.  Restless leg syndrome.  Continue with ropinirole.

 

9.  Anxiety and depression.  Continue with Prozac and Ativan.

 

10.  Gastroesophageal reflux disease (GERD).  Continue with famotidine.

 

11.  Epigastric hernia, stable.

 

12.  Chronic kidney disease stage IV.  Creatinine is stable.

 

13.  Morbid obesity and obstructive sleep apnea.  Noncompliant with continuous

positive airway pressure (CPAP).

 

14.  History of endometrial cancer, status post hysterectomy.  No issues at this

point.

 

15.  Irritable bowel syndrome.  No problem at this time.

 

16.  Seasonal allergies.  No problem at this point.

 

17.  Low normal blood pressure.  We will continue with metoprolol with hold

parameters.  THe patient is also on Lasix and isosorbide.  We will continue to

monitor blood pressures.

 

18.  Deep vein thrombosis (DVT) prophylaxis.  In place.

 

19.  Gastrointestinal prophylaxis.  In place.

MTDD

## 2017-01-09 NOTE — IPN
DATE:  01/09/2017

 

Patient continues to be about the same.  She again had an episode of chest

discomfort yesterday evening that lasted approximately an hour and half and

eventually was relieved by Percocet.  Other than that, she had no other symptoms.

She continues to have relatively low blood pressure with occasional readings in

high a 80s and in 90s.  Not overly short of breath while she is sitting in bed.

 

 

Otherwise, her physical exam is unchanged.  Heart rate is in 70s.  She is

afebrile.  Saturation is 95% on room air.  JVP is not up, even though is

difficult to  with her body habitus.  Lungs are clear to auscultation.

Heart exam regular.  No gallop or rub or murmur is appreciated and no edema.

 

Laboratory wise, CBC is normal and basic metabolic panel potassium 4.2, BUN 32,

creatinine 2.2 and glucose 122.

 

ASSESSMENT/PLAN:  Mrs. Degroot is a 51-year-old female who has a complicated

history that includes history of mitral valve replacement times two, history of

septal myectomy for hypertrophic cardiomyopathy, and a history of coronary

intervention with stents in distal LAD and diagonal artery.  She presents with

several month history of chest discomfort that is fairly atypical in the duration

and absence of relationship to activity, but on the other hand it radiates to

about her left shoulder and left side of her jaw.  There is no clear-cut Troponin

elevation.  She consistently has a marginally elevated readings, just over 0.1.

This has been case here as well as in [facility].  She has underlying

left bundle branch block, so EKG is not helpful for diagnostic purposes and she

has preserved LV systolic function based on echocardiogram.

 

At this point, I believe she can be discharged home, at least from my

perspective.  I will arrange for outpatient PET scan as soon as possible and she

will then follow with Dr. Collier in [place].

## 2017-01-10 NOTE — EDDOCDS
Nurse's Notes                                                                                     

[facility]                                                                         

Name: Ruth Degroot                                                                           

Age: 51 yrs                                                                                       

Sex: Female                                                                                       

: 1965                                                                                   

MRN: [id number]                                                                                     

Arrival Date: 2017                                                                          

Time: 01:47                                                                                       

Account#: [id number]                                                                              

Bed Admit Hold                                                                                    

Private MD:                                                                                       

Diagnosis: Chest pain, unspecified                                                                

                                                                                                  

Presentation:                                                                                     

                                                                                             

01:56 Presenting complaint: EMS states: Chest pain radiating down left arm and into jaw for   kmg1

      approx 1.5 hours. Took Nitro without relief. Aspirin was taken PTA. Adult Sepsis            

      Screening: The patient does not have new or worsening altered mentation. Patient's          

      respiratory rate is less than 22. Systolic blood pressure is greater than 100. Patient      

      has a qSOFA score of 0- Negative Sepsis Screen. Suicide/Homicide risk assessment- the       

      patient denies having any suicidal and/or homicidal ideations and does not present with     

      any other emotional, behavioral or mental health complaints.  Status: Patient       

      is not a  or  dependent. Transition of care: patient was not          

      received from another setting of care. Care prior to arrival: See EMS report.               

      Medications administered prior to arrival: ASA, NTG.                                        

01:56 Acuity: BEE Level 2                                                                     km

01:56 Method Of Arrival: Ambulance                                                            [facility] [place]

                                                                                                  

Triage Assessment:                                                                                

02:09 General: Appears in no apparent distress, comfortable, obese, Behavior is appropriate   [facility] [place] [facility]

      for age, cooperative, pleasant. Pain: Location: anterior aspect of left upper chest         

      Pain currently is 9 out of 10 on a pain scale. Pain radiates to left arm and neck           

      Quality of pain is described as sharp. HIV screening NA for this visit Offered              

      previously. The patient is triaged at the bedside. See Assessment in Nurses Notes           

      section of ED record. Cardiovascular: Capillary refill < 3 seconds Heart tones S1 S2        

      present Rhythm is Left BBB Chest pain is described as severe, quality is stabbing, is       

      located in left anterior radiates to left arm(s) neck episodes are continuous began 2       

      hours prior to arrival Chest pain began 2 hours ago. Respiratory: Airway is patent          

      Respiratory effort is even, unlabored, Respiratory pattern is regular, symmetrical,         

      Breath sounds are clear bilaterally. Denies shortness of breath. GI: Reports nausea.        

      Derm: Skin is pink, warm & dry.                                                           

                                                                                                  

OB/GYN:                                                                                           

02:09 LMP N/A - Hysterectomy                                                                  kmg1

                                                                                                  

Historical:                                                                                       

- Allergies: Doxycycline; Keflex (Rash); Lipitor; Morphine; Nexium; Donnatal (tongue swelling);   

- Home Meds:                                                                                      

1. aspirin 81 mg Oral chew 1 tab once daily                                                     

     (Last dose: 2017)                                                                      

2. Ativan 1 mg Oral tab 1 tab 3 times per day as needed                                         

     (Last dose: 2017 00:30)                                                                

3. Fish Oil 1,000 mg Oral cap                                                                   

     (Last dose: 2017)                                                                      

4. folic acid 800 mcg Oral tab 1 tab once daily                                                 

     (Last dose: 2017)                                                                      

5. isosorbide mononitrate 30 mg Oral Tb24 1 tab once daily                                      

     (Last dose: 2017)                                                                      

6. Lasix 20 mg Oral tab 1 tab once daily                                                        

     (Last dose: 2017)                                                                      

7. levothyroxine 25 mcg Oral tab 1 tab once daily                                               

     (Last dose: 2017)                                                                      

8. Lyrica 50 mg Oral 1 cap twice a day                                                          

     (Last dose: 2017)                                                                      

9. metoprolol succinate 25 mg Tb24 .5 tab twice a day                                           

     (Last dose: 2017)                                                                      

10. multivitamin Oral tab 1 tab daily                                                           

     (Last dose: 2017)                                                                      

11. nitroglycerin 0.4 mg SL subl 1 tab also had one by ems en route                             

     (Last dose: 2017 00:35)                                                                

12. Pepcid 20 mg Oral tab 1 tab twice a day                                                     

     (Last dose: 2017)                                                                      

13. Plavix 75 mg Oral tab 1 tab once daily                                                      

     (Last dose: 2017)                                                                      

14. Prozac 40 mg Oral cap 1 cap once daily                                                      

     (Last dose: 2017)                                                                      

15. Ranexa 1,000 mg oral Tb12 1 tab 2 times per day                                             

     (Last dose: 2017)                                                                      

16. Requip 1 mg Oral tab 1 tab nightly 1-2 tabs at HS as needed                                 

17. Zyrtec 10 mg Oral tab 1 tab once daily                                                      

     (Last dose: 2017)                                                                      

- PMHx: Anxiety; CHF; Depression; GERD; Hypercholesterolemia; Hypertension;                       

Hypothyroidism; restless leg syndrome; Seasonal Allergies; CRF; Endometrial Cancer;             

- PSHx: Valve Replacement; Post Tibial Transplant; Septomyectomy; Adenoidectomy;                  

Tonsillectomy; Hysterectomy; Cardiac stents; Tracheostomy;                                      

- Social history: Smoking status: Patient states former smoker of tobacco. No barriers            

to communication noted, The patient speaks fluent English, Speaks appropriately for             

age.                                                                                            

- Family history: No immediate family members are acutely ill.                                    

- : The pt / caregiver states he / she is on anticoagulants: Plavix. Home medication              

list is obtained from the patient, [employer] import data.                                         

- Exposure Risk Screening:: None identified.                                                      

                                                                                                  

                                                                                                  

Screenin:00 Screening information is obtained from the patient. Fall risk: No risks identified.     kmg1

      Assistance ADL's: requires no assistance with activities of daily living. Abuse/DV          

      Screen: The patient / caregiver reports he/she is: not in a situation that causes fear,     

      pain or injury. Nutritional screening: No deficits noted. Advance Directives:               

      Currently, there is a health care proxy, Pedro Singh (son). There is no active DNR order.     

      home support is adequate.                                                                   

                                                                                                  

Assessment:                                                                                       

02:00 General: See Triage Assessment.                                                         kmg1

03:00 General: Appears in no apparent distress, comfortable, Behavior is appropriate for age, kmg1

      cooperative, pleasant. Pain: Location: neck and left arm and anterior aspect of left        

      upper chest Pain currently is 3 out of 10 on a pain scale. Cardiovascular:.                 

      Cardiovascular: Rhythm is sinus arrythmia. Respiratory: Airway is patent Respiratory        

      effort is even, unlabored, Respiratory pattern is regular, symmetrical.                     

04:00 Reassessment: Patient appears in no apparent distress at this time. Patient states      kmg1

      feeling better. Patient states symptoms have improved. Resting quietly .                    

04:58 General: Appears in no apparent distress, comfortable, Behavior is appropriate for age, kmg1

      cooperative, pleasant. Pain: Location: anterior aspect of left upper chest Pain             

      currently is 7 out of 10 on a pain scale. Quality of pain is described as stabbing.         

05:59 Reassessment: Patient appears in no apparent distress at this time. Patient states      kmg1

      feeling better. Patient states symptoms have improved.                                      

07:49 General: Pt lying on stretcher. States that chest pain has recurred and feels like a    jc4 

      sharp pain in the left side of her chest and radiates into her left neck and left arm.      

      Color pink, skin warm and dry. Respirations easy and full. Dr. Ivory made aware.          

09:06 General: Pt resting on stretcher. Appears comfortable. Color pink, skin warm and dry.   jc4 

      Respirations easy and full. States that pain is improved and is currently "4/10" at         

      this time. Call bell in reach.                                                              

09:29 General: Appears in no apparent distress, comfortable, Behavior is appropriate for age, dsf 

      cooperative. Pain: Location: chest Pain currently is 7 out of 10 on a pain scale. Pain      

      does not radiate. Quality of pain is described as sharp, stabbing. Neurological: Level      

      of Consciousness is awake, alert, Oriented to person, place, time. Cardiovascular:          

      Capillary refill < 3 seconds Heart tones S1 S2 present Rhythm is sinus rhythm with 1st      

      degree heart block. Respiratory: Airway is patent Respiratory effort is even,               

      unlabored, Respiratory pattern is regular, symmetrical, Breath sounds are clear             

      bilaterally. GI: Abdomen is obese, Bowel sounds present X 4 quads. Abd is soft and non      

      tender X 4 quads. Derm: Skin is pink, warm & dry.                                         

10:28 General: Lili performing echocardiogram on patient .                                  dsf 

11:28 Adult Sepsis Screening: The patient does not have new or worsening altered mentation.   dsf 

      Patient's respiratory rate is less than 22. Systolic blood pressure is greater than         

      100. Patient has a qSOFA score of 0- Negative Sepsis Screen. General: Appears in no         

      apparent distress, comfortable, Behavior is appropriate for age, cooperative. Pain:         

      Location: chest Pain currently is 2 out of 10 on a pain scale. Quality of pain is           

      described as sharp, stabbing. Neurological: Level of Consciousness is awake, alert.         

      Cardiovascular: Capillary refill < 3 seconds Rhythm is sinus rhythm with 1st degree         

      heart block. Respiratory: Airway is patent Respiratory effort is even, unlabored,           

      Respiratory pattern is regular, symmetrical. Derm: Skin is pink, warm & dry.              

12:28 General: Appears in no apparent distress, Behavior is appropriate for age, cooperative. dsf 

      Neurological: Level of Consciousness is awake, alert. Cardiovascular: Capillary refill      

      < 3 seconds Rhythm is sinus rhythm with 1st degree heart block. Respiratory: Airway is      

      patent Respiratory effort is even, unlabored, Respiratory pattern is regular,               

      symmetrical. Derm: Skin is pink, warm & dry.                                              

12:52 General: Dr. Sanches in room examining patient .                                         dsf 

13:30 Adult Sepsis Screening: The patient does not have new or worsening altered mentation.   dsf 

      Patient's respiratory rate is less than 22. Systolic blood pressure is greater than         

      100. Patient has a qSOFA score of 0- Negative Sepsis Screen. General: Appears in no         

      apparent distress, Behavior is appropriate for age, cooperative. Neurological: Level of     

      Consciousness is awake, alert. Cardiovascular: Capillary refill < 3 seconds.                

      Respiratory: Airway is patent Respiratory effort is even, unlabored, Respiratory            

      pattern is regular, symmetrical. Derm: Skin is pink, warm & dry.                          

14:04 General: pt sitting up eating a lunch tray.                                             dsf 

14:30 General: Appears in no apparent distress, comfortable, Behavior is appropriate for age, dsf 

      cooperative, pleasant. Neurological: Level of Consciousness is awake, alert.                

      Cardiovascular: Capillary refill < 3 seconds Rhythm is sinus rhythm with 1st degree         

      heart block. Respiratory: Airway is patent Respiratory effort is even, unlabored,           

      Respiratory pattern is regular, symmetrical. Derm: Skin is pink, warm & dry.              

15:03 General: Appears in no apparent distress, comfortable, Behavior is appropriate for age, dsf 

      cooperative. Pain: Location: chest Pain currently is 6 out of 10 on a pain scale.           

      Quality of pain is described as sharp, stabbing. Neurological: Level of Consciousness       

      is awake, alert. Cardiovascular: Capillary refill < 3 seconds. Respiratory: Airway is       

      patent Respiratory effort is even, unlabored, Respiratory pattern is regular,               

      symmetrical. Derm: Skin is pink, warm & dry.                                              

16:03 General: Appears in no apparent distress, Behavior is appropriate for age, cooperative. dsf 

      Neurological: Level of Consciousness is awake, alert. Cardiovascular: Capillary refill      

      < 3 seconds. Respiratory: Airway is patent Respiratory effort is even, unlabored,           

      Respiratory pattern is regular, symmetrical. Derm: Skin is pink, warm & dry.              

17:03 Adult Sepsis Screening: The patient does not have new or worsening altered mentation.   dsf 

      Patient's respiratory rate is less than 22. Systolic blood pressure is greater than         

      100. Patient has a qSOFA score of 0- Negative Sepsis Screen. General: Appears in no         

      apparent distress, Behavior is appropriate for age, cooperative. Neurological: Level of     

      Consciousness is awake, alert. Cardiovascular: Capillary refill < 3 seconds.                

      Respiratory: Airway is patent Respiratory effort is even, unlabored, Respiratory            

      pattern is regular, symmetrical. Derm: Skin is pink, warm & dry.                          

18:03 General: Appears in no apparent distress, Behavior is appropriate for age, cooperative. dsf 

      Neurological: Level of Consciousness is awake, alert. Cardiovascular: Capillary refill      

      < 3 seconds Rhythm is sinus rhythm with 1st degree heart block. Respiratory: Airway is      

      patent Respiratory effort is even, unlabored, Respiratory pattern is regular,               

      symmetrical. Derm: Skin is pink, warm & dry.                                              

19:29 General: Appears in no apparent distress, comfortable, Behavior is cooperative. Pain:   lf1 

      Location: anterior aspect of left upper chest Pain currently is 5 out of 10 on a pain       

      scale. Pain radiates to neck and left arm. Neurological: Level of Consciousness is          

      awake, alert, Oriented to person, place, time. EENT: No deficits noted. Respiratory:        

      Respiratory effort is even, unlabored. GI: Abdomen is obese, Denies nausea, vomiting.       

      Derm: Skin is normal. Injury Description: No known injury.                                  

20:00 General:. General: Appears in no apparent distress, Behavior is cooperative.            lf1 

      Neurological: Level of Consciousness is awake, alert.                                       

                                                                                                  

Vital Signs:                                                                                      

01:57  / 71; Pulse 76; Resp 20; Temp 98.5(O); Pulse Ox 97% on R/A; Weight 127.01 kg;    ls3 

      Height 5 ft. 1 in. (154.94 cm); Pain 9/10;                                                  

05:55  / 66 (auto/);                                                                    jc4 

05:55 Pulse 70 MON; Pulse Ox 98% ;                                                            jc4 

06:10  / 85 (auto/);                                                                    jc4 

06:10 Pulse 70 MON; Pulse Ox 95% ;                                                            jc4 

06:25  / 71 (auto/);                                                                    jc4 

06:25 Pulse 70 MON; Pulse Ox 96% ;                                                            jc4 

06:40  / 75 (auto/);                                                                    jc4 

06:40 Pulse 70 MON; Pulse Ox 95% ;                                                            jc4 

06:55  / 74 (auto/);                                                                    jc4 

06:55 Pulse 70 MON; Pulse Ox 94% ;                                                            jc4 

07:10  / 77 (auto/);                                                                    jc4 

07:10 Pulse 70 MON; Pulse Ox 95% ;                                                            jc4 

07:23 Pulse 76 MON; Pulse Ox 99% ;                                                            jc4 

07:25  / 70 (auto/);                                                                    jc4 

07:40  / 61 (auto/);                                                                    jc4 

07:40 Pulse 70 MON; Pulse Ox 97% ;                                                            jc4 

07:46  / 57 (auto/);                                                                    jc4 

07:46 Pulse 68 MON; Pulse Ox 96% ;                                                            jc4 

07:49  / 57; Pulse 69; Resp 20; Pulse Ox 97% on R/A;                                    jc4 

07:55 BP 92 / 49 (auto/);                                                                     jc4 

07:55 Pulse 70 MON; Pulse Ox 97% ;                                                            jc4 

08:00 Pulse 66 MON; Pulse Ox 96% ;                                                            jc4 

08:00  / 60 (auto/);                                                                    jc4 

08:09 Pulse 68 MON; Pulse Ox 97% ;                                                            jc4 

08:10  / 65 (auto/);                                                                    jc4 

08:39 Pulse 72 MON; Pulse Ox 95% ;                                                            jc4 

08:40  / 59 (auto/);                                                                    jc4 

08:54 Pulse 70 MON; Pulse Ox 95% ;                                                            jc4 

08:55  / 75 (auto/);                                                                    jc4 

09:05 Pain 4/10;                                                                              jc4 

09:10  / 63 (auto/);                                                                    dsf 

09:10 Pulse 70 MON; Pulse Ox 93% ;                                                            dsf 

09:25  / 60 (auto/);                                                                    dsf 

09:25 Pulse 70 MON; Resp 20; Temp 97.5(TE); Pulse Ox 93% on R/A; Pain 7/10;                   dsf 

09:40  / 63 (auto/);                                                                    dsf 

09:40 Pulse 68 MON; Pulse Ox 95% ;                                                            dsf 

09:55  / 72 (auto/);                                                                    dsf 

09:55 Pulse 62 MON; Pulse Ox 93% ;                                                            dsf 

10:10  / 68 (auto/);                                                                    dsf 

10:10 Pulse 58 MON; Pulse Ox 94% ;                                                            dsf 

10:25  / 61 (auto/);                                                                    dsf 

10:25 Pulse 68 MON; Pulse Ox 96% ;                                                            dsf 

11:07 Pain 7/10;                                                                              dsf 

11:32 Pain 2/10;                                                                              dsf 

11:40  / 55 (auto/);                                                                    dsf 

11:40 Pulse 66 MON; Pulse Ox 95% ;                                                            dsf 

11:55  / 62 (auto/);                                                                    dsf 

11:55 Pulse 66 MON; Pulse Ox 96% ;                                                            dsf 

12:10  / 59 (auto/);                                                                    dsf 

12:10 Pulse 66 MON; Pulse Ox 96% ;                                                            dsf 

12:25  / 60 (auto/);                                                                    dsf 

12:25 Pulse 64 MON; Pulse Ox 96% ;                                                            dsf 

12:40  / 57 (auto/);                                                                    dsf 

12:40 Pulse 68 MON; Pulse Ox 95% ;                                                            dsf 

12:55  / 73 (auto/);                                                                    dsf 

12:55 Pulse 68 MON; Pulse Ox 94% ;                                                            dsf 

13:10 Pulse 68 MON; Pulse Ox 96% ;                                                            dsf 

13:10  / 74 (auto/); Resp 20; Temp 97.6(TE); Pain 210;                                 dsf 

13:25  / 87 (auto/);                                                                    dsf 

13:25 Pulse 66 MON; Pulse Ox 93% ;                                                            dsf 

13:40  / 62 (auto/);                                                                    dsf 

13:40 Pulse 68 MON; Pulse Ox 95% ;                                                            dsf 

13:55  / 73 (auto/);                                                                    dsf 

13:55 Pulse 70 MON; Pulse Ox 97% ;                                                            dsf 

14:25  / 67 (auto/);                                                                    dsf 

14:26 Pulse 82 MON; Pulse Ox 94% ;                                                            dsf 

16:52  / 91 (auto/);                                                                    dsf 

16:52 Pulse 76 MON; Pulse Ox 94% ;                                                            dsf 

17:07  / 77 (auto/);                                                                    dsf 

17:07 Pulse 76 MON; Pulse Ox 94% ;                                                            dsf 

17:22  / 86 (auto/);                                                                    dsf 

17:22 Pulse 78 MON; Resp 20; Temp 97.8(TE); Pulse Ox 95% on R/A; Pain 110;                   dsf 

17:36 Pulse 78 MON; Pulse Ox 94% ;                                                            lf1 

17:37  / 100 (auto/);                                                                   lf1 

17:51 Pulse 80 MON; Pulse Ox 95% ;                                                            lf1 

17:52  / 99 (auto/);                                                                    lf1 

18:05 Pulse 78 MON; Pulse Ox 94% ;                                                            lf1 

18:07  / 65 (auto/);                                                                    lf1 

18:21 Pulse 80 MON; Pulse Ox 95% ;                                                            lf1 

18:22  / 64 (auto/);                                                                    lf1 

18:47 Pulse 78 MON; Pulse Ox 94% ;                                                            lf1 

18:48  / 56 (auto/);                                                                    lf1 

18:52  / 56 (auto/);                                                                    lf1 

18:52 Pulse 78 MON; Pulse Ox 94% ;                                                            lf1 

19:07  / 70 (auto/);                                                                    lf1 

19:07 Pulse 80 MON; Pulse Ox 96% ;                                                            lf1 

19:22  / 66 (auto/);                                                                    lf1 

19:22 Pulse 82 MON; Resp 18; Temp 97.5(O); Pulse Ox 94% ; Pain 5/10;                          lf1 

19:52  / 81 (auto/);                                                                    lf1 

19:52 Pulse 86 MON; Resp 18; Pulse Ox 95% ; Pain 5/10;                                        lf1 

01:57 Body Mass Index 52.91 (127.01 kg, 154.94 cm)                                            ls3 

                                                                                                  

Vitals:                                                                                           

02:00 Refer to monitor trend for complete vital signs trends.                                 kmg1

02:09 Log In Time N/A - ambulance arrival.                                                    kmg1

                                                                                                  

ED Course:                                                                                        

01:49 Patient visited by Lopresti, Mary-Elizabeth, Unit Clerk.                                ml3 

01:49 Ana Jacome, RN is Primary Nurse.                                                   ml3 

01:49 Patient moved to Waiting                                                                ml3 

01:49 Patient moved to 17                                                                     ml3 

01:58 Triage Initiated                                                                        kmg1

02:00 The patient / caregiver is instructed regarding the plan of care and ED course. Cardiac kmg1

      monitor on. Pulse ox on. NIBP on.                                                           

02:05 EKG done. (by ED staff). Reviewed by Gillian Ivory DO.                                kb5 

02:06 Patient visited by Bancroft, Kristopher, PCA.                                           kb5 

02:15 Gillian Ivory DO is Attending Physician.                                            mm11

02:15 Patient visited by Gillian Ivory DO.                                                mm11

02:24 Patient visited by Gillian Ivory DO.                                                mm11

02:46 Inserted saline lock: 20 gauge in left forearm.                                         kmg1

02:58 Patient visited by Gillian Ivory DO.                                                mm11

03:40 Patient visited by Gillian Ivory DO.                                                mm11

04:59 Patient visited by Ana Jacome, LYN.                                                 kmg1

05:59 Patient visited by Ana Jacome, LYN.                                                 kmg1

07:17 Patient visited by Gillian Ivory DO.                                                mm11

07:24 [facility] Payment Agreement was scanned into "[employer] and attached to record.               [id number]

07:29 Primary Nurse role handed off by Ana Jacome, RN                                    js13

07:54 EKG done. (by ED staff). Reviewed by Gillian Ivory DO.                                ct3 

08:01 Patient visited by Rajni Hale PCA.                                              ct3 

08:03 CARDIAC MARKER PANEL Sent.                                                              jc4 

08:11 Attending Physician role handed off by Gillian Ivory,                              ml  

08:11 Lundborg-Gray, Maja, MD is Attending Physician.                                         ml  

08:41 Chest, 2 View (pa\E\lat) Returned.                                                        EDMS

09:02 Patient visited by Rajni Hale PCA.                                              ct3 

09:05 Patient visited by Siobhan Leonard RN.                                                jc4 

09:12 EKG-ADULT Returned.                                                                     EDMS

09:15 Chest, 2 View (pa\E\lat) Returned.                                                        EDMS

09:30 Patient visited by Archana Zabala RN.                                                   dsf 

10:09 Patient visited by Rajni Hale PCA.                                              ct3 

10:28 Patient visited by Archana Zabala RN.                                                   dsf 

11:30 Patient visited by Rajni Hale PCA.                                              ct3 

11:38 Peter Marly is Hospitalizing Provider.                                                      ml  

11:44 Patient visited by Archana Zabala RN.                                                   dsf 

12:46 Patient visited by Archana Zabala RN.                                                   dsf 

13:45 Patient visited by Rajni Hale PCA.                                              ct3 

13:45 Diet: consistent diet given to patient.                                                 ct3 

14:05 Patient visited by Archana Zabala RN.                                                   dsf 

14:09 Patient moved to Admit Hold                                                             kpj 

14:30 Patient visited by Archana Zabala RN.                                                   dsf 

15:04 Patient visited by Archana Zabala RN.                                                   dsf 

16:10 Patient visited by Archana Zabala RN.                                                   dsf 

16:29 CT Chest without contrast Returned.                                                     EDMS

16:29 CT Abdomen without contrast Returned.                                                   EDMS

18:29 Patient visited by Archana Zabala RN.                                                   dsf 

19:05 Patient visited by Bancroft, Kristopher, PCA.                                           kb5 

19:29 Patient visited by Migdalia West RN.                                                        lf1 

19:52 No procedures done that require assistance.                                             lf1 

20:58 T-Sheet-- Draft Copy was scanned into "[employer] and attached to record.                   klr 

                                                                                             

08:56 ECG/EKG was scanned into "[employer] and attached to record.                                gb  

                                                                                                  

Administered Medications:                                                                         

                                                                                             

02:46 Drug: fentaNYL (PF) 50 mcg [fentanyl (PF) 50 mcg/mL injection solution (1 mL)] Route:   kmg1

      IVP; Site: left forearm;                                                                    

05:06 Drug: fentaNYL (PF) 50 mcg [fentanyl (PF) 50 mcg/mL injection solution (1 mL)] Route:   kmg1

      IVP; Site: left forearm;                                                                    

07:57 Drug: Ondansetron 4 mg [ondansetron HCl 2 mg/mL intravenous solution (2 mL)] Route:     jc4 

      IVP; Site: left antecubital;                                                                

08:12 Drug: fentaNYL (PF) 50 mcg [fentanyl (PF) 50 mcg/mL injection solution (1 mL)] Route:   jc4 

      IVP; Site: left antecubital;                                                                

09:05 Follow up: Pain 4/10 Adult                                                              jc4 

10:26 Drug: fentaNYL (PF) 50 mcg [fentanyl (PF) 50 mcg/mL injection solution (1 mL)] Route:   dsf 

      IVP; Site: left antecubital;                                                                

11:07 Follow up: Pain 7/10 Adult; see trend VS                                                dsf 

11:11 Drug: fentaNYL (PF) 50 mcg [fentanyl (PF) 50 mcg/mL injection solution (1 mL)] Route:   dsf 

      IVP; Site: left antecubital;                                                                

11:32 Follow up: Pain 2/10 Adult; see trend VS                                                dsf 

15:00 Drug: oxyCODONE-acetaminophen 1 tabs [oxycodone-acetaminophen 5 mg-325 mg tablet (1     dsf 

      tabs)] Route: PO;                                                                           

                                                                                                  

                                                                                                  

Intake:                                                                                           

                                                                                                  

Output:                                                                                           

14:39 Urine: 150.00ml (Voided); Total: 150.00ml.                                              dsf 

                                                                                                  

Order Results:                                                                                    

Lab Order: Basic Metabolic Profile; SPEC'M 17 02:27                                         

      Test: GLUCOSE, FASTING; Value: 144; Range: ; Abnormal: Above high normal; Units:      

      MG/DL; Status: F                                                                            

      Test: BLOOD UREA NITROGEN; Value: 40; Range: 7-18; Abnormal: Above high normal; Units:      

      MG/DL; Status: F                                                                            

      Test: CREATININE FOR GFR; Value: 2.31; Range: 0.55-1.02; Abnormal: Above high normal;       

      Units: MG/DL; Status: F                                                                     

      Test: GLOMERULAR FILTRATION RATE; Value: 23.6; Range: >51; Abnormal: Below low normal;      

      Status: F                                                                                   

      Test: SODIUM LEVEL; Value: 145; Range: 136-145; Units: MEQ/L; Status: F                     

      Test: POTASSIUM SERUM; Value: 4.4; Range: 3.5-5.1; Units: MEQ/L; Status: F                  

      Test: CHLORIDE LEVEL; Value: 109; Range: ; Abnormal: Above high normal; Units:        

      MEQ/L; Status: F                                                                            

      Test: CARBON DIOXIDE LEVEL; Value: 26; Range: 21-32; Units: MEQ/L; Status: F                

      Test: ANION GAP; Value: 10; Range: 8-16; Units: MEQ/L; Status: F                            

      Test: CALCIUM LEVEL; Value: 8.9; Range: 8.5-10.1; Units: MG/DL; Status: F                   

      Test Note: &nbsp;; Units are mL/min/1.73 m2 Chronic Kidney Disease Staging per NKF:       

      Stage I & II GFR >=60 Normal to Mildly Decreased Stage III GFR 30-59 Moderately           

      Decreased Stage IV GFR 15-29 Severely Decreased Stage V GFR <15 Very Little GFR Left        

      ESRD GFR <15 on RRT                                                                         

Lab Order: CBC with Diff; SPEC'M 17 02:27                                                   

      Test: WHITE BLOOD COUNT; Value: 9.5; Range: 4.0-10.0; Units: K/mm3; Status: F               

      Test: RED BLOOD COUNT; Value: 4.25; Range: 4.00-5.40; Units: M/mm3; Status: F               

      Test: HEMOGLOBIN; Value: 13.7; Range: 12.0-16.0; Units: g/dl; Status: F                     

      Test: HEMATOCRIT; Value: 40.9; Range: 36.0-47.0; Units: %; Status: F                        

      Test: MEAN CORPUSCULAR VOLUME; Value: 96.3; Range: 80.0-96.0; Abnormal: Above high          

      normal; Units: fl; Status: F                                                                

      Test: MEAN CORPUSCULAR HEMOGLOBIN; Value: 32.2; Range: 27.0-33.0; Units: pg; Status: F      

      Test: MEAN CORPUSCULAR HGB CONC; Value: 33.4; Range: 32.0-36.5; Units: g/dl; Status: F      

      Test: RED CELL DISTRIBUTION WIDTH; Value: 14.3; Range: 11.5-14.5; Units: %; Status: F       

      Test: PLATELET COUNT, AUTOMATED; Value: 158; Range: 150-450; Units: k/mm3; Status: F        

      Test: NEUTROPHILS %; Value: 64.1; Range: 36.0-66.0; Units: %; Status: F                     

      Test: LYMPH %; Value: 25.6; Range: 24.0-44.0; Units: %; Status: F                           

      Test: MONO %; Value: 6.0; Range: 0.0-5.0; Abnormal: Above high normal; Units: %;            

      Status: F                                                                                   

      Test: EOS %; Value: 1.5; Range: 0.0-3.0; Units: %; Status: F                                

      Test: BASO %; Value: 0.7; Range: 0.0-1.0; Units: %; Status: F                               

      Test: LARGE UNSTAINED CELL %; Value: 2.0; Range: 0.0-4.0; Units: %; Status: F               

      Test: NEUTROPHILS #; Value: 6.1; Range: 1.8-7.7; Units: K/mm3; Status: F                    

      Test: LYMPH #; Value: 2.6; Range: 1.5-4.5; Units: K/mm3; Status: F                          

      Test: MONO #; Value: 0.6; Range: 0.0-0.8; Units: K/mm3; Status: F                           

      Test: EOS #; Value: 0.2; Range: 0.0-0.50; Units: K/mm3; Status: F                           

      Test: BASO #; Value: 0.1; Range: 0.0-0.2; Units: K/mm3; Status: F                           

      Test: LARGE UNSTAINED CELL #; Value: 0.2; Range: 0.0-0.4; Units: K/mm3; Status: F           

Lab Order: Cardiac Injury Profile; SPEC'M 17 02:27                                          

      Test: CPK CREATINE PHOSPHOKINASE; Value: 74; Range: ; Units: U/L; Status: F           

      Test: CK-MB VALUE MASS; Value: 2.0; Range: 0.0-3.6; Units: NG/ML; Status: F                 

      Test: MB/CK RELATIVE INDEX; Value: 2.70; Range: < OR =4; Status: F                          

      Test Note: &nbsp;; DIAGNOSIS CRITERIA MMB ng/ml Relative Index (RI) NON-AMI < or = 5      

      N/A GRAY ZONE > 5 < or = 4 AMI > 5 > 4                                                      

Lab Order: Partial Thromboplastin Time; SPEC 17 02:27                                     

      Test: PARTIAL THROMBOPLASTIN TIME; Value: 24.1; Range: 26.6-37.1; Abnormal: Below low       

      normal; Units: SECONDS; Status: F                                                           

Lab Order: Prothrombin Time Profile\E\INR; SPEC17 02:27                                  

      Test: PROTHROMBIN TIME; Value: 14.7; Range: 12.3-14.5; Abnormal: Above high normal;         

      Units: SECONDS; Status: F                                                                   

      Test: INR; Value: 1.14; Status: F                                                           

      Test Note: &nbsp;; THERAPUTIC HUMAN INR VALUES INDICATIONS NORMAL RANGES                  

      PROPHYLAXIS/TREATMENT OF: VENOUS THROMBOSIS 2.0-3.0 PULMONARY EMBOLISM 2.0-3.0              

      PREVENTION OF SYSTEMIC EMBOLISM FROM: TISSUE HEART VALVES 2.0-3.0 ACUTE MYOCARDIAL          

      INFARCTION 2.0-3.0 VALVULAR HEART DISEASE 2.0-3.0 ATRIAL FIBRILLATION 2.0-3.0               

      MECHANICAL VALVES(HIGH RISK) 2.5-3.5 RECURRENT MYOCARDIAL INFARCTION 2.5-3.5                

Lab Order: Troponin; SPEC'M 01/07/17 02:27                                                        

      Test: TROPONIN I; Value: 0.16; Range: < 0.10; Abnormal: Above high normal; Units:           

      NG/ML; Status: F                                                                            

      Test Note: &nbsp;; Troponin I Reference Interval for [employer] [facility] LOCI: 99th             

      Percentile= 0.00-0.045 ng/ml Risk Stratification: <= 0.10 ng/ml Decreased Risk for          

      Adverse Clinical Events. 0.10-1.50 ng/ml Increased Risk for Adverse Clinical Events.        

      Evaluation of additional criterion and/or repeat testing in 2-6 hours is suggested to       

      rule out myocardial damage. >= 1.50 ng/ml Indicative of Myocardial Injury.                  

Lab Order: CARDIAC MARKER PANEL; SPEC17 08:01                                            

      Test: CPK CREATINE PHOSPHOKINASE; Value: 59; Range: ; Units: U/L; Status: F           

      Test: CK-MB VALUE MASS; Value: 2.0; Range: 0.0-3.6; Units: NG/ML; Status: F                 

      Test: MB/CK RELATIVE INDEX; Value: 3.38; Range: < OR =4; Status: F                          

      Test: TROPONIN I; Value: 0.16; Range: < 0.10; Abnormal: Above high normal; Units:           

      NG/ML; Status: F                                                                            

      Test Note: &nbsp;; DIAGNOSIS CRITERIA MMB ng/ml Relative Index (RI) NON-AMI < or = 5      

      N/A GRAY ZONE > 5 < or = 4 AMI > 5 > 4                                                      

Lab Order: CARDIAC MARKER PANEL; SPEC' 17 14:21                                            

      Test: CPK CREATINE PHOSPHOKINASE; Value: 59; Range: ; Units: U/L; Status: F           

      Test: CK-MB VALUE MASS; Value: 2.5; Range: 0.0-3.6; Units: NG/ML; Status: F                 

      Test: MB/CK RELATIVE INDEX; Value: 4.23; Range: < OR =4; Abnormal: Above high normal;       

      Status: F                                                                                   

      Test: TROPONIN I; Value: 0.15; Range: < 0.10; Abnormal: Above high normal; Units:           

      NG/ML; Status: F                                                                            

      Test Note: &nbsp;; DIAGNOSIS CRITERIA MMB ng/ml Relative Index (RI) NON-AMI < or = 5      

      N/A GRAY ZONE > 5 < or = 4 AMI > 5 > 4                                                      

Lab Order: MAGNESIUM LEVEL; SPEC 17 14:21                                                 

      Test: MAGNESIUM LEVEL; Value: 2.0; Range: 1.8-2.4; Units: MG/DL; Status: F                  

Lab Order: LIVER PROFILE; SPEC 17 14:21                                                   

      Test: AST/SGOT; Value: 20; Range: 15-37; Units: U/L; Status: F                              

      Test: ALT/SGPT; Value: 21; Range: 12-78; Units: U/L; Status: F                              

      Test: ALKALINE PHOSPHATASE; Value: 96; Range: ; Units: U/L; Status: F                 

      Test: BILIRUBIN,TOTAL; Value: 0.6; Range: 0.2-1.0; Units: MG/DL; Status: F                  

      Test: BILIRUBIN,DIRECT; Value: 0.2; Range: 0.0-0.2; Units: MG/DL; Status: F                 

      Test: TOTAL PROTEIN; Value: 6.6; Range: 6.4-8.2; Units: GM/DL; Status: F                    

      Test: ALBUMIN; Value: 3.0; Range: 3.2-5.2; Abnormal: Below low normal; Units: GM/DL;        

      Status: F                                                                                   

      Test: ALBUMIN/GLOBULIN RATIO; Value: 0.83; Range: 1.00-1.93; Abnormal: Below low            

      normal; Status: F                                                                           

                                                                                                  

Radiology Order: EKG-ADULT                                                                        

      Test: EKG-ADULT                                                                             

      REASON FOR EXAMINATION: Chest Pain; Stationary ECG Study; [facility] - ED; ;        

      Test Date: 2017; Pat Name: RUTH DEGROOT Department:; Patient ID: [id number]        

      Room: -; Gender: F Technician: QUEENIE; : 1965 Requested By: GILLIAN JUÁREZ;         

      Order Number: [id number] Reading MD: Aletha Lizama; Measurements;         

      Intervals Axis; Rate: 73 P: 21; TN: 217 QRS: -12; QRSD: 165 T: 111; QT: 473; QTc: 524;      

      Interpretive Statements; SINUS RHYTHM WITH FIRST DEGREE AV BLOCK; LEFT BUNDLE BRANCH        

      BLOCK; NO PRIOR FOR COMPARISON; Electronically Signed On 2017 8:43:43 EST by Aletha Lizama;                                                                            

Radiology Order: Chest, 2 View (pa\E\lat)                                                         

      Test: Chest, 2 View (pa\E\lat)                                                              

      REASON FOR EXAMINATION: Chest Pain; Chest x-ray: Two views.; ; History: Chest pain. No      

      comparison views.; ; Findings: The patient is status post prior median sternotomy. EKG      

      monitoring; electrodes overlie the chest. The heart is mildly enlarged. Pulmonary;          

      vasculature is not increased. Pleural angles are sharp. No significant bony;                

      abnormality is seen.; ; Impression:; ; Mild cardiomegaly. Otherwise no acute disease.;      

      ; ; Signed by; Ric Dowell MD 2017 08:47 A;                                          

Radiology Order: CT Chest without contrast                                                        

      Test: CT Chest without contrast                                                             

      REASON FOR EXAMINATION: Chest pain on left side; CT of the study of the chest without       

      contrast:; ; History: Left-sided chest pain.; ; Findings: The patient is status post        

      prior median sternotomy. There is an; epigastric ventral hernia containing a portion of     

      the left lobe of the liver.; This is seen to protrude through a defect in the upper         

      abdominal subxyphoid wall; 6.6 cm in craniocaudal by 6.5 cm in medial to lateral span.;     

      ; There is coronary stent graft and vascular calcification. A mitral valvular;              

      prosthesis is seen. Mitral annular calcification is noted. No mediastinal or; hilar         

      mass or adenopathy is observed. There is no evidence of pleural or; pericardial             

      effusion. Minimal fibrotic changes are noted in the lungs. Bone; window settings show       

      no bony destructive lesion. There are clips in the; gallbladder post cholecystectomy.       

      No adrenal lesion is observed.; ; Impression:; ; Prior median sternotomy,                   

      cholecystectomy, mitral valve replacement. Subxyphoid; epigastric hernia transmitting a     

      portion of the left lobe of the liver 6 cm in; diameter. No other significant               

      abnormality.; ; ; Signed by; Ric Dowell MD 2017 04:38 P;                            

Radiology Order: CT Abdomen without contrast                                                      

      Test: CT Abdomen without contrast                                                           

      REASON FOR EXAMINATION: ventral hernia; CT abdomen without IV or oral contrast:; ;          

      History: Ventral hernia. Comparison CT study is from 2016.; ; Findings: There       

      is no evidence of pleural effusion or ascites. The liver and; spleen are normal in size     

      and homogeneous in texture. A subxyphoid epigastric; ventral hernia is seen                 

      transmitting a 6 cm portion of the left lobe of the liver; into the anterior abdominal      

      wall. This is essentially unchanged from the  study. The patient is status     

      post mitral valve replacement. No adrenal; lesion is seen. No pancreatic abnormality is     

      noted. There are clips in the; gallbladder fossa. No renal abnormality is seen. No          

      retroperitoneal mass or; adenopathy is seen. Normal small and large bowel loops are         

      seen.; ; Impression:; ; Subxyphoid epigastric hernia transmits a 6 cm portion of the        

      left lobe of the; liver into the anterior abdominal wall essentially unchanged.             

      Postoperative; changes. No acute abdominal abnormality.; ; ; Signed by; Ric Dowell MD     

      2017 04:39 P;                                                                         

Outcome:                                                                                          

11:39 Decision to Hospitalize by Provider.                                                    ml  

19:52 Discharge Assessment: Patient awake, alert and oriented x 3. No cognitive and/or        lf1 

      functional deficits noted. Patient verbalized understanding of disposition                  

      instructions. The following High Risk Discharge criteria are identified: None. Admitted     

      to ICU accompanied by nurse, accompanied by tech, via stretcher, on monitor, with           

      chart. Condition: improved.                                                                 

20:12 Property :Personal belongings accompany Pt.                                             lf1 

20:12 Discharge Assessment: patient administered narcotics - yes. Patient was admitted to the [facility] 

      hospital or transferred to another facility. CT Study completed.                            

20:12 Patient left the ED.                                                                    [facility] 

                                                                                                  

Signatures:                                                                                       

Dispatcher [employer]                           EDMS                                                 

Lundborg-Gray, Maja, MD MD                                                      

Ana Jacome, RN                     RN   [id number]                                                 

Diana Ramirez, RN                       RN   [facility]                                                  

Kiersten Linares, Reg                  Reg  gb                                                   

Lopresti, Jam, Unit Clerk    Unit ml3                                                  

Bancroft, Kristopher, PCA               PCA  kb5                                                  

Migdalia West,RN                            RN   lf1                                                  

Gillian Ivory, DO                    DO   [id number]                                                 

Siobhan Leonard, RN                    RN   jc4                                                  

Rajni Hale, PCA                  PCA  ct3                                                  

Archana Zabala,RN                       RN   dsf                                                  

Siobhan Mckeon,RN                    RN   [id number]                                                 

Ana Chase, PCA                     PCA  ls3                                                  

Juhi Estrada Diane                                [id number]                                                 

                                                                                                  

Corrections: (The following items were deleted from the chart)                                    

01:58 01:57  / 71; Pulse 76bpm; Resp 20bpm; Pulse Ox 97% RA; Temp 98.5F Oral; 127.01    ls3 

      kg; Pain 9/10; ls3                                                                          

17:30 13:10  / 74 Auto; dsf                                                             dsf 

17:31 17:22 Pulse 78bpm; Monitor; Pulse Ox 95%; dsf                                           dsf 

                                                                                                  

**************************************************************************************************



*** Chart Complete ***
MTDD

## 2017-01-10 NOTE — EDDOCDS
Physician Documentation                                                                           

[facility]                                                                         

Name: Ruth Degroot                                                                           

Age: 51 yrs                                                                                       

Sex: Female                                                                                       

: 1965                                                                                   

MRN: [id number]                                                                                     

Arrival Date: 2017                                                                          

Time: 01:47                                                                                       

Account#: [id number]                                                                              

Bed Admit Hold                                                                                    

Private MD:                                                                                       

Disposition:                                                                                      

17 11:39 Hospitalization ordered by Marly Green for Inpatient Admission. Preliminary            

diagnosis is Chest pain, unspecified.                                                           

- Bed requested for  ICU.                                                                        

- Status is Inpatient Admission.                                                              lf1 

- Condition is Stable.                                                                            

- Problem is new.                                                                                 

- Symptoms are unchanged.                                                                         

                                                                                                  

                                                                                                  

                                                                                                  

Historical:                                                                                       

- Allergies: Doxycycline; Keflex (Rash); Lipitor; Morphine; Nexium; Donnatal (tongue swelling);   

- Home Meds:                                                                                      

1. aspirin 81 mg Oral chew 1 tab once daily                                                     

     (Last dose: 2017)                                                                      

2. Ativan 1 mg Oral tab 1 tab 3 times per day as needed                                         

     (Last dose: 2017 00:30)                                                                

3. Fish Oil 1,000 mg Oral cap                                                                   

     (Last dose: 2017)                                                                      

4. folic acid 800 mcg Oral tab 1 tab once daily                                                 

     (Last dose: 2017)                                                                      

5. isosorbide mononitrate 30 mg Oral Tb24 1 tab once daily                                      

     (Last dose: 2017)                                                                      

6. Lasix 20 mg Oral tab 1 tab once daily                                                        

     (Last dose: 2017)                                                                      

7. levothyroxine 25 mcg Oral tab 1 tab once daily                                               

     (Last dose: 2017)                                                                      

8. Lyrica 50 mg Oral 1 cap twice a day                                                          

     (Last dose: 2017)                                                                      

9. metoprolol succinate 25 mg Tb24 .5 tab twice a day                                           

     (Last dose: 2017)                                                                      

10. multivitamin Oral tab 1 tab daily                                                           

     (Last dose: 2017)                                                                      

11. nitroglycerin 0.4 mg SL subl 1 tab also had one by em[facility] en route                             

     (Last dose: 2017 00:35)                                                                

12. Pepcid 20 mg Oral tab 1 tab twice a day                                                     

     (Last dose: 2017)                                                                      

13. Plavix 75 mg Oral tab 1 tab once daily                                                      

     (Last dose: 2017)                                                                      

14. Prozac 40 mg Oral cap 1 cap once daily                                                      

     (Last dose: 2017)                                                                      

15. Ranexa 1,000 mg oral Tb12 1 tab 2 times per day                                             

     (Last dose: 2017)                                                                      

16. Requip 1 mg Oral tab 1 tab nightly 1-2 tabs at HS as needed                                 

17. Zyrtec 10 mg Oral tab 1 tab once daily                                                      

     (Last dose: 2017)                                                                      

- PMHx: Anxiety; CHF; Depression; GERD; Hypercholesterolemia; Hypertension;                       

Hypothyroidism; restless leg syndrome; Seasonal Allergies; CRF; Endometrial Cancer;             

- PSHx: Valve Replacement; Post Tibial Transplant; Septomyectomy; Adenoidectomy;                  

Tonsillectomy; Hysterectomy; Cardiac stents; Tracheostomy;                                      

- Social history: Smoking status: Patient states former smoker of tobacco. No barriers            

to communication noted, The patient speaks fluent English, Speaks appropriately for             

age.                                                                                            

- Family history: No immediate family members are acutely ill.                                    

- : The pt / caregiver states he / she is on anticoagulants: Plavix. Home medication              

list is obtained from the patient, Get[employer] import data.                                         

- Exposure Risk Screening:: None identified.                                                      

                                                                                                  

                                                                                                  

OB/GYN:                                                                                           

                                                                                             

02:09 LMP N/A - Hysterectomy                                                                  kmg1

                                                                                                  

Vital Signs:                                                                                      

01:57  / 71; Pulse 76; Resp 20; Temp 98.5(O); Pulse Ox 97% on R/A; Weight 127.01 kg /   ls3 

      280.01 lbs; Height 5 ft. 1 in. (154.94 cm); Pain 9/10;                                      

05:55  / 66 (auto/);                                                                    jc4 

05:55 Pulse 70 MON; Pulse Ox 98% ;                                                            jc4 

06:10  / 85 (auto/);                                                                    jc4 

06:10 Pulse 70 MON; Pulse Ox 95% ;                                                            jc4 

06:25  / 71 (auto/);                                                                    jc4 

06:25 Pulse 70 MON; Pulse Ox 96% ;                                                            jc4 

06:40  / 75 (auto/);                                                                    jc4 

06:40 Pulse 70 MON; Pulse Ox 95% ;                                                            jc4 

06:55  / 74 (auto/);                                                                    jc4 

06:55 Pulse 70 MON; Pulse Ox 94% ;                                                            jc4 

07:10  / 77 (auto/);                                                                    jc4 

07:10 Pulse 70 MON; Pulse Ox 95% ;                                                            jc4 

07:23 Pulse 76 MON; Pulse Ox 99% ;                                                            jc4 

07:25  / 70 (auto/);                                                                    jc4 

07:40  / 61 (auto/);                                                                    jc4 

07:40 Pulse 70 MON; Pulse Ox 97% ;                                                            jc4 

07:46  / 57 (auto/);                                                                    jc4 

07:46 Pulse 68 MON; Pulse Ox 96% ;                                                            jc4 

07:49  / 57; Pulse 69; Resp 20; Pulse Ox 97% on R/A;                                    jc4 

07:55 BP 92 / 49 (auto/);                                                                     jc4 

07:55 Pulse 70 MON; Pulse Ox 97% ;                                                            jc4 

08:00 Pulse 66 MON; Pulse Ox 96% ;                                                            jc4 

08:00  / 60 (auto/);                                                                    jc4 

08:09 Pulse 68 MON; Pulse Ox 97% ;                                                            jc4 

08:10  / 65 (auto/);                                                                    jc4 

08:39 Pulse 72 MON; Pulse Ox 95% ;                                                            jc4 

08:40  / 59 (auto/);                                                                    jc4 

08:54 Pulse 70 MON; Pulse Ox 95% ;                                                            jc4 

08:55  / 75 (auto/);                                                                    jc4 

09:05 Pain 4/10;                                                                              jc4 

09:10  / 63 (auto/);                                                                    dsf 

09:10 Pulse 70 MON; Pulse Ox 93% ;                                                            dsf 

09:25  / 60 (auto/);                                                                    dsf 

09:25 Pulse 70 MON; Resp 20; Temp 97.5(TE); Pulse Ox 93% on R/A; Pain 7/10;                   dsf 

09:40  / 63 (auto/);                                                                    dsf 

09:40 Pulse 68 MON; Pulse Ox 95% ;                                                            dsf 

09:55  / 72 (auto/);                                                                    dsf 

09:55 Pulse 62 MON; Pulse Ox 93% ;                                                            dsf 

10:10  / 68 (auto/);                                                                    dsf 

10:10 Pulse 58 MON; Pulse Ox 94% ;                                                            dsf 

10:25  / 61 (auto/);                                                                    dsf 

10:25 Pulse 68 MON; Pulse Ox 96% ;                                                            dsf 

11:07 Pain 7/10;                                                                              dsf 

11:32 Pain 2/10;                                                                              dsf 

11:40  / 55 (auto/);                                                                    dsf 

11:40 Pulse 66 MON; Pulse Ox 95% ;                                                            dsf 

11:55  / 62 (auto/);                                                                    dsf 

11:55 Pulse 66 MON; Pulse Ox 96% ;                                                            dsf 

12:10  / 59 (auto/);                                                                    dsf 

12:10 Pulse 66 MON; Pulse Ox 96% ;                                                            dsf 

12:25  / 60 (auto/);                                                                    dsf 

12:25 Pulse 64 MON; Pulse Ox 96% ;                                                            dsf 

12:40  / 57 (auto/);                                                                    dsf 

12:40 Pulse 68 MON; Pulse Ox 95% ;                                                            dsf 

12:55  / 73 (auto/);                                                                    dsf 

12:55 Pulse 68 MON; Pulse Ox 94% ;                                                            dsf 

13:10 Pulse 68 MON; Pulse Ox 96% ;                                                            dsf 

13:10  / 74 (auto/); Resp 20; Temp 97.6(TE); Pain 2/10;                                 dsf 

13:25  / 87 (auto/);                                                                    dsf 

13:25 Pulse 66 MON; Pulse Ox 93% ;                                                            dsf 

13:40  / 62 (auto/);                                                                    dsf 

13:40 Pulse 68 MON; Pulse Ox 95% ;                                                            dsf 

13:55  / 73 (auto/);                                                                    dsf 

13:55 Pulse 70 MON; Pulse Ox 97% ;                                                            dsf 

14:25  / 67 (auto/);                                                                    dsf 

14:26 Pulse 82 MON; Pulse Ox 94% ;                                                            dsf 

16:52  / 91 (auto/);                                                                    dsf 

16:52 Pulse 76 MON; Pulse Ox 94% ;                                                            dsf 

17:07  / 77 (auto/);                                                                    dsf 

17:07 Pulse 76 MON; Pulse Ox 94% ;                                                            dsf 

17:22  / 86 (auto/);                                                                    dsf 

17:22 Pulse 78 MON; Resp 20; Temp 97.8(TE); Pulse Ox 95% on R/A; Pain 110;                   dsf 

17:36 Pulse 78 MON; Pulse Ox 94% ;                                                            lf1 

17:37  / 100 (auto/);                                                                   lf1 

17:51 Pulse 80 MON; Pulse Ox 95% ;                                                            lf1 

17:52  / 99 (auto/);                                                                    lf1 

18:05 Pulse 78 MON; Pulse Ox 94% ;                                                            lf1 

18:07  / 65 (auto/);                                                                    lf1 

18:21 Pulse 80 MON; Pulse Ox 95% ;                                                            lf1 

18:22  / 64 (auto/);                                                                    lf1 

18:47 Pulse 78 MON; Pulse Ox 94% ;                                                            lf1 

18:48  / 56 (auto/);                                                                    lf1 

18:52  / 56 (auto/);                                                                    lf1 

18:52 Pulse 78 MON; Pulse Ox 94% ;                                                            lf1 

19:07  / 70 (auto/);                                                                    lf1 

19:07 Pulse 80 MON; Pulse Ox 96% ;                                                            lf1 

19:22  / 66 (auto/);                                                                    lf1 

19:22 Pulse 82 MON; Resp 18; Temp 97.5(O); Pulse Ox 94% ; Pain 5/10;                          lf1 

19:52  / 81 (auto/);                                                                    lf1 

19:52 Pulse 86 MON; Resp 18; Pulse Ox 95% ; Pain 5/10;                                        lf1 

01:57 Body Mass Index 52.91 (127.01 kg, 154.94 cm)                                            ls3 

                                                                                                  

MDM:                                                                                              

02:00 ECG WITH READING ER PHYS+CARDIAG ordered.                                               EDMS

02:25 Cardiac Monitor/Pulse Ox/q 30 min VS ordered.                                           mm11

02:25 IV Saline Lock ordered.                                                                 mm11

02:25 Rhythm Strip to chart ordered.                                                          mm11

02:25 Undress patient appropriately for examination ordered.                                  mm11

02:25 fentaNYL (PF) 50 mcg IVP once ordered.                                                  mm11

02:26 Basic Metabolic Profile Ordered.                                                        EDMS

02:26 CBC with Diff Ordered.                                                                  EDMS

02:26 Cardiac Injury Profile Ordered.                                                         EDMS

02:26 Partial Thromboplastin Time Ordered.                                                    EDMS

02:26 Prothrombin Time Profile\E\INR Ordered.                                                   EDMS

02:26 Troponin Ordered.                                                                       EDMS

02:26 Chest, 2 View (pa\E\lat) Ordered.                                                         EDMS

02:58 Basic Metabolic Profile Reviewed.                                                       mm11

02:58 CBC with Diff Reviewed.                                                                 mm11

02:58 Partial Thromboplastin Time Reviewed.                                                   mm11

02:58 Prothrombin Time Profile\E\INR Reviewed.                                                  mm11

02:58 Troponin Reviewed.                                                                      mm11

02:58 Cardiac Injury Profile Reviewed.                                                        mm11

03:39 Redraw CIP &Troponin (put time in details section) ordered.                             mm11

03:39 Repeat EKG (put time details section) ordered.                                          mm11

03:40 Repeat EKG (put time details section) complete.                                         ml3 

03:40 Redraw CIP &Troponin (put time in details section) complete.                            ml3

03:41 ECG WITH READING ER PHYS ordered.                                                       EDMS

03:42 CARDIAC MARKER PANEL Ordered.                                                           EDMS

05:03 fentaNYL (PF) 50 mcg IVP once ordered.                                                  mm11

07:24 [facility] Payment Agreement was scanned into [employer] and attached to record.               dm19

07:25 Financial registration complete.                                                        dm19

07:49 Ondansetron 4 mg IVP once ordered.                                                      ml  

08:03 fentaNYL (PF) 50 mcg IVP once ordered.                                                  ml  

08:38 CARDIAC MARKER PANEL Reviewed.                                                          ml  

08:43 Chest, 2 View (pa\E\lat) Reviewed.                                                        ml

09:08 BED REQUEST+ADM ordered.                                                                EDMS

09:08 ECHOCARDIOGRAM,DOPPLER/COLOR FLOW+CARDIAG ordered.                                      EDMS

10:20 fentaNYL (PF) 50 mcg IVP once ordered.                                                  ml  

11:07 fentaNYL (PF) 50 mcg IVP once ordered.                                                  dsf 

12:28 CT Chest without contrast Ordered.                                                      EDMS

12:29 CT Abdomen without contrast Ordered.                                                    EDMS

13:14 CARDIAC MARKER PANEL Ordered.                                                           EDMS

13:15 CARDIAC MARKER PANEL Ordered.                                                           EDMS

13:15 MAGNESIUM LEVEL Ordered.                                                                EDMS

13:15 LIVER PROFILE Ordered.                                                                  EDMS

13:21 ELECTROCARDIOGRAM ADULT ordered.                                                        EDMS

13:21 CONSISTENT CARBOHYDRATES ordered.                                                       EDMS

13:28 Admission / Observation Status ordered.                                                 EDMS

13:29 PHYSICAL THERAPY EVAL & TREAT ordered.                                                  EDMS

15:02 oxyCODONE-acetaminophen 5 mg-325 mg 1 tabs PO once; admission order ordered.            dsf 

19:30 CARDIAC MARKER PANEL Ordered.                                                           EDMS

19:31 CBC WITH DIFFERENTIAL Ordered.                                                          EDMS

19:31 BASIC METABOLIC PROFILE Ordered.                                                        EDMS

20:58 T-Sheet-- Draft Copy was scanned into [employer] and attached to record.                   klr 

                                                                                             

08:56 ECG/EKG was scanned into [employer] and attached to record.                                gb  

                                                                                                  

Administered Medications:                                                                         

                                                                                             

02:46 Drug: fentaNYL (PF) 50 mcg [fentanyl (PF) 50 mcg/mL injection solution (1 mL)] Route:   kmg1

      IVP; Site: left forearm;                                                                    

05:06 Drug: fentaNYL (PF) 50 mcg [fentanyl (PF) 50 mcg/mL injection solution (1 mL)] Route:   kmg1

      IVP; Site: left forearm;                                                                    

07:57 Drug: Ondansetron 4 mg [ondansetron HCl 2 mg/mL intravenous solution (2 mL)] Route:     jc4 

      IVP; Site: left antecubital;                                                                

08:12 Drug: fentaNYL (PF) 50 mcg [fentanyl (PF) 50 mcg/mL injection solution (1 mL)] Route:   jc4 

      IVP; Site: left antecubital;                                                                

09:05 Follow up: Pain 4/10 Adult                                                              jc4 

10:26 Drug: fentaNYL (PF) 50 mcg [fentanyl (PF) 50 mcg/mL injection solution (1 mL)] Route:   dsf 

      IVP; Site: left antecubital;                                                                

11:07 Follow up: Pain 7/10 Adult; see trend VS                                                dsf 

11:11 Drug: fentaNYL (PF) 50 mcg [fentanyl (PF) 50 mcg/mL injection solution (1 mL)] Route:   dsf 

      IVP; Site: left antecubital;                                                                

11:32 Follow up: Pain 2/10 Adult; see trend VS                                                dsf 

15:00 Drug: oxyCODONE-acetaminophen 1 tabs [oxycodone-acetaminophen 5 mg-325 mg tablet (1     dsf 

      tabs)] Route: PO;                                                                           

                                                                                                  

                                                                                                  

Signatures:                                                                                       

Dispatcher [employer]                           EDMS Lundborg-Gray, Maja, MD MD   ml                                                   

Ana Jacome, RN                     RN   [id number]                                                 

Kiersten Linares, Moo                  Reg  gb                                                   

Lopresti, Jam, Unit Clerk    Unit ml3                                                  

Migdalia West RN                            RN   [id number]                                                  

Rogers Ivory,                     DO   [id number]                                                 

Siobhan Leonard RN                    RN   [id number]                                                  

Archana Zabala RN RN dsf Redder, Kathie klr McLear, Diane                                [id number]                                                 

                                                                                                  

The chart was reviewed and I authenticate all verbal orders and agree with the evaluation and 
treatment provided.Attachments:

07:24 [facility] Payment Agreement                                                                [id number]

20:58 T-Sheet-- Draft Copy                                                                    r 

                                                                                             

08:56 ECG/EKG                                                                                 gb  

                                                                                                  

**************************************************************************************************



*** Chart Complete ***
MTDD

## 2017-01-10 NOTE — ECGEPIP
Stationary ECG Study

                              [facility]

                                       

                                       Test Date:    2017

Pat Name:     RENATO OCHOA        Department:   

Patient ID:   [id number]                 Room:         [place] [address]

Gender:       F                        Technician:   ANNA

:          1965               Requested By: ANTONIETTA SAHA 

Order Number: [id number]     Reading MD:   Patrick Berumen

                                 Measurements

Intervals                              Axis          

Rate:         72                       P:            9

OK:           193                      QRS:          -10

QRSD:         158                      T:            124

QT:           466                                    

QTc:          511                                    

                           Interpretive Statements

SINUS RHYTHM

LEFT BUNDLE BRANCH BLOCK

 

No significant change when compared to prior tracing of 2017

Electronically Signed On 1- 8:42:01 EST by Patrick Berumen

## 2017-01-10 NOTE — EDDOCDS
Physician Documentation                                                                           

[facility]                                                                         

Name: Ruth Degroot                                                                           

Age: 51 yrs                                                                                       

Sex: Female                                                                                       

: 1965                                                                                   

MRN: [id number]                                                                                     

Arrival Date: 2017                                                                          

Time: 01:47                                                                                       

Account#: [id number]                                                                              

Bed Admit Hold                                                                                    

Private MD:                                                                                       

Disposition:                                                                                      

17 11:39 Hospitalization ordered by Marly Green for Inpatient Admission. Preliminary            

diagnosis is Chest pain, unspecified.                                                           

- Bed requested for  ICU.                                                                        

- Status is Inpatient Admission.                                                              lf1 

- Condition is Stable.                                                                            

- Problem is new.                                                                                 

- Symptoms are unchanged.                                                                         

                                                                                                  

                                                                                                  

                                                                                                  

Historical:                                                                                       

- Allergies: Doxycycline; Keflex (Rash); Lipitor; Morphine; Nexium; Donnatal (tongue swelling);   

- Home Meds:                                                                                      

1. aspirin 81 mg Oral chew 1 tab once daily                                                     

     (Last dose: 2017)                                                                      

2. Ativan 1 mg Oral tab 1 tab 3 times per day as needed                                         

     (Last dose: 2017 00:30)                                                                

3. Fish Oil 1,000 mg Oral cap                                                                   

     (Last dose: 2017)                                                                      

4. folic acid 800 mcg Oral tab 1 tab once daily                                                 

     (Last dose: 2017)                                                                      

5. isosorbide mononitrate 30 mg Oral Tb24 1 tab once daily                                      

     (Last dose: 2017)                                                                      

6. Lasix 20 mg Oral tab 1 tab once daily                                                        

     (Last dose: 2017)                                                                      

7. levothyroxine 25 mcg Oral tab 1 tab once daily                                               

     (Last dose: 2017)                                                                      

8. Lyrica 50 mg Oral 1 cap twice a day                                                          

     (Last dose: 2017)                                                                      

9. metoprolol succinate 25 mg Tb24 .5 tab twice a day                                           

     (Last dose: 2017)                                                                      

10. multivitamin Oral tab 1 tab daily                                                           

     (Last dose: 2017)                                                                      

11. nitroglycerin 0.4 mg SL subl 1 tab also had one by ems en route                             

     (Last dose: 2017 00:35)                                                                

12. Pepcid 20 mg Oral tab 1 tab twice a day                                                     

     (Last dose: 2017)                                                                      

13. Plavix 75 mg Oral tab 1 tab once daily                                                      

     (Last dose: 2017)                                                                      

14. Prozac 40 mg Oral cap 1 cap once daily                                                      

     (Last dose: 2017)                                                                      

15. Ranexa 1,000 mg oral Tb12 1 tab 2 times per day                                             

     (Last dose: 2017)                                                                      

16. Requip 1 mg Oral tab 1 tab nightly 1-2 tabs at HS as needed                                 

17. Zyrtec 10 mg Oral tab 1 tab once daily                                                      

     (Last dose: 2017)                                                                      

- PMHx: Anxiety; CHF; Depression; GERD; Hypercholesterolemia; Hypertension;                       

Hypothyroidism; restless leg syndrome; Seasonal Allergies; CRF; Endometrial Cancer;             

- PSHx: Valve Replacement; Post Tibial Transplant; Septomyectomy; Adenoidectomy;                  

Tonsillectomy; Hysterectomy; Cardiac stents; Tracheostomy;                                      

- Social history: Smoking status: Patient states former smoker of tobacco. No barriers            

to communication noted, The patient speaks fluent English, Speaks appropriately for             

age.                                                                                            

- Family history: No immediate family members are acutely ill.                                    

- : The pt / caregiver states he / she is on anticoagulants: Plavix. Home medication              

list is obtained from the patient, [employer] import data.                                         

- Exposure Risk Screening:: None identified.                                                      

                                                                                                  

                                                                                                  

OB/GYN:                                                                                           

                                                                                             

02:09 LMP N/A - Hysterectomy                                                                  kmg1

                                                                                                  

Vital Signs:                                                                                      

01:57  / 71; Pulse 76; Resp 20; Temp 98.5(O); Pulse Ox 97% on R/A; Weight 127.01 kg /   ls3 

      280.01 lbs; Height 5 ft. 1 in. (154.94 cm); Pain 9/10;                                      

05:55  / 66 (auto/);                                                                    jc4 

05:55 Pulse 70 MON; Pulse Ox 98% ;                                                            jc4 

06:10  / 85 (auto/);                                                                    jc4 

06:10 Pulse 70 MON; Pulse Ox 95% ;                                                            jc4 

06:25  / 71 (auto/);                                                                    jc4 

06:25 Pulse 70 MON; Pulse Ox 96% ;                                                            jc4 

06:40  / 75 (auto/);                                                                    jc4 

06:40 Pulse 70 MON; Pulse Ox 95% ;                                                            jc4 

06:55  / 74 (auto/);                                                                    jc4 

06:55 Pulse 70 MON; Pulse Ox 94% ;                                                            jc4 

07:10  / 77 (auto/);                                                                    jc4 

07:10 Pulse 70 MON; Pulse Ox 95% ;                                                            jc4 

07:23 Pulse 76 MON; Pulse Ox 99% ;                                                            jc4 

07:25  / 70 (auto/);                                                                    jc4 

07:40  / 61 (auto/);                                                                    jc4 

07:40 Pulse 70 MON; Pulse Ox 97% ;                                                            jc4 

07:46  / 57 (auto/);                                                                    jc4 

07:46 Pulse 68 MON; Pulse Ox 96% ;                                                            jc4 

07:49  / 57; Pulse 69; Resp 20; Pulse Ox 97% on R/A;                                    jc4 

07:55 BP 92 / 49 (auto/);                                                                     jc4 

07:55 Pulse 70 MON; Pulse Ox 97% ;                                                            jc4 

08:00 Pulse 66 MON; Pulse Ox 96% ;                                                            jc4 

08:00  / 60 (auto/);                                                                    jc4 

08:09 Pulse 68 MON; Pulse Ox 97% ;                                                            jc4 

08:10  / 65 (auto/);                                                                    jc4 

08:39 Pulse 72 MON; Pulse Ox 95% ;                                                            jc4 

08:40  / 59 (auto/);                                                                    jc4 

08:54 Pulse 70 MON; Pulse Ox 95% ;                                                            jc4 

08:55  / 75 (auto/);                                                                    jc4 

09:05 Pain 4/10;                                                                              jc4 

09:10  / 63 (auto/);                                                                    dsf 

09:10 Pulse 70 MON; Pulse Ox 93% ;                                                            dsf 

09:25  / 60 (auto/);                                                                    dsf 

09:25 Pulse 70 MON; Resp 20; Temp 97.5(TE); Pulse Ox 93% on R/A; Pain 7/10;                   dsf 

09:40  / 63 (auto/);                                                                    dsf 

09:40 Pulse 68 MON; Pulse Ox 95% ;                                                            dsf 

09:55  / 72 (auto/);                                                                    dsf 

09:55 Pulse 62 MON; Pulse Ox 93% ;                                                            dsf 

10:10  / 68 (auto/);                                                                    dsf 

10:10 Pulse 58 MON; Pulse Ox 94% ;                                                            dsf 

10:25  / 61 (auto/);                                                                    dsf 

10:25 Pulse 68 MON; Pulse Ox 96% ;                                                            dsf 

11:07 Pain 7/10;                                                                              dsf 

11:32 Pain 2/10;                                                                              dsf 

11:40  / 55 (auto/);                                                                    dsf 

11:40 Pulse 66 MON; Pulse Ox 95% ;                                                            dsf 

11:55  / 62 (auto/);                                                                    dsf 

11:55 Pulse 66 MON; Pulse Ox 96% ;                                                            dsf 

12:10  / 59 (auto/);                                                                    dsf 

12:10 Pulse 66 MON; Pulse Ox 96% ;                                                            dsf 

12:25  / 60 (auto/);                                                                    dsf 

12:25 Pulse 64 MON; Pulse Ox 96% ;                                                            dsf 

12:40  / 57 (auto/);                                                                    dsf 

12:40 Pulse 68 MON; Pulse Ox 95% ;                                                            dsf 

12:55  / 73 (auto/);                                                                    dsf 

12:55 Pulse 68 MON; Pulse Ox 94% ;                                                            dsf 

13:10 Pulse 68 MON; Pulse Ox 96% ;                                                            dsf 

13:10  / 74 (auto/); Resp 20; Temp 97.6(TE); Pain 2/10;                                 dsf 

13:25  / 87 (auto/);                                                                    dsf 

13:25 Pulse 66 MON; Pulse Ox 93% ;                                                            dsf 

13:40  / 62 (auto/);                                                                    dsf 

13:40 Pulse 68 MON; Pulse Ox 95% ;                                                            dsf 

13:55  / 73 (auto/);                                                                    dsf 

13:55 Pulse 70 MON; Pulse Ox 97% ;                                                            dsf 

14:25  / 67 (auto/);                                                                    dsf 

14:26 Pulse 82 MON; Pulse Ox 94% ;                                                            dsf 

16:52  / 91 (auto/);                                                                    dsf 

16:52 Pulse 76 MON; Pulse Ox 94% ;                                                            dsf 

17:07  / 77 (auto/);                                                                    dsf 

17:07 Pulse 76 MON; Pulse Ox 94% ;                                                            dsf 

17:22  / 86 (auto/);                                                                    dsf 

17:22 Pulse 78 MON; Resp 20; Temp 97.8(TE); Pulse Ox 95% on R/A; Pain 110;                   dsf 

17:36 Pulse 78 MON; Pulse Ox 94% ;                                                            lf1 

17:37  / 100 (auto/);                                                                   lf1 

17:51 Pulse 80 MON; Pulse Ox 95% ;                                                            lf1 

17:52  / 99 (auto/);                                                                    lf1 

18:05 Pulse 78 MON; Pulse Ox 94% ;                                                            lf1 

18:07  / 65 (auto/);                                                                    lf1 

18:21 Pulse 80 MON; Pulse Ox 95% ;                                                            lf1 

18:22  / 64 (auto/);                                                                    lf1 

18:47 Pulse 78 MON; Pulse Ox 94% ;                                                            lf1 

18:48  / 56 (auto/);                                                                    lf1 

18:52  / 56 (auto/);                                                                    lf1 

18:52 Pulse 78 MON; Pulse Ox 94% ;                                                            lf1 

19:07  / 70 (auto/);                                                                    lf1 

19:07 Pulse 80 MON; Pulse Ox 96% ;                                                            lf1 

19:22  / 66 (auto/);                                                                    lf1 

19:22 Pulse 82 MON; Resp 18; Temp 97.5(O); Pulse Ox 94% ; Pain 5/10;                          lf1 

19:52  / 81 (auto/);                                                                    lf1 

19:52 Pulse 86 MON; Resp 18; Pulse Ox 95% ; Pain 5/10;                                        lf1 

01:57 Body Mass Index 52.91 (127.01 kg, 154.94 cm)                                            ls3 

                                                                                                  

MDM:                                                                                              

02:00 ECG WITH READING ER PHYS+CARDIAG ordered.                                               EDMS

02:25 Cardiac Monitor/Pulse Ox/q 30 min VS ordered.                                           mm11

02:25 IV Saline Lock ordered.                                                                 mm11

02:25 Rhythm Strip to chart ordered.                                                          mm11

02:25 Undress patient appropriately for examination ordered.                                  mm11

02:25 fentaNYL (PF) 50 mcg IVP once ordered.                                                  mm11

02:26 Basic Metabolic Profile Ordered.                                                        EDMS

02:26 CBC with Diff Ordered.                                                                  EDMS

02:26 Cardiac Injury Profile Ordered.                                                         EDMS

02:26 Partial Thromboplastin Time Ordered.                                                    EDMS

02:26 Prothrombin Time Profile\E\INR Ordered.                                                   EDMS

02:26 Troponin Ordered.                                                                       EDMS

02:26 Chest, 2 View (pa\E\lat) Ordered.                                                         EDMS

02:58 Basic Metabolic Profile Reviewed.                                                       mm11

02:58 CBC with Diff Reviewed.                                                                 mm11

02:58 Partial Thromboplastin Time Reviewed.                                                   mm11

02:58 Prothrombin Time Profile\E\INR Reviewed.                                                  mm11

02:58 Troponin Reviewed.                                                                      mm11

02:58 Cardiac Injury Profile Reviewed.                                                        mm11

03:39 Redraw CIP &Troponin (put time in details section) ordered.                             mm11

03:39 Repeat EKG (put time details section) ordered.                                          mm11

03:40 Repeat EKG (put time details section) complete.                                         ml3 

03:40 Redraw CIP &Troponin (put time in details section) complete.                            ml3

03:41 ECG WITH READING ER PHYS ordered.                                                       EDMS

03:42 CARDIAC MARKER PANEL Ordered.                                                           EDMS

05:03 fentaNYL (PF) 50 mcg IVP once ordered.                                                  mm11

07:24 [facility] Payment Agreement was scanned into [employer] and attached to record.               dm19

07:25 Financial registration complete.                                                        dm19

07:49 Ondansetron 4 mg IVP once ordered.                                                      ml  

08:03 fentaNYL (PF) 50 mcg IVP once ordered.                                                  ml  

08:38 CARDIAC MARKER PANEL Reviewed.                                                          ml  

08:43 Chest, 2 View (pa\E\lat) Reviewed.                                                        ml

09:08 BED REQUEST+ADM ordered.                                                                EDMS

09:08 ECHOCARDIOGRAM,DOPPLER/COLOR FLOW+CARDIAG ordered.                                      EDMS

10:20 fentaNYL (PF) 50 mcg IVP once ordered.                                                  ml  

11:07 fentaNYL (PF) 50 mcg IVP once ordered.                                                  dsf 

12:28 CT Chest without contrast Ordered.                                                      EDMS

12:29 CT Abdomen without contrast Ordered.                                                    EDMS

13:14 CARDIAC MARKER PANEL Ordered.                                                           EDMS

13:15 CARDIAC MARKER PANEL Ordered.                                                           EDMS

13:15 MAGNESIUM LEVEL Ordered.                                                                EDMS

13:15 LIVER PROFILE Ordered.                                                                  EDMS

13:21 ELECTROCARDIOGRAM ADULT ordered.                                                        EDMS

13:21 CONSISTENT CARBOHYDRATES ordered.                                                       EDMS

13:28 Admission / Observation Status ordered.                                                 EDMS

13:29 PHYSICAL THERAPY EVAL & TREAT ordered.                                                  EDMS

15:02 oxyCODONE-acetaminophen 5 mg-325 mg 1 tabs PO once; admission order ordered.            dsf 

19:30 CARDIAC MARKER PANEL Ordered.                                                           EDMS

19:31 CBC WITH DIFFERENTIAL Ordered.                                                          EDMS

19:31 BASIC METABOLIC PROFILE Ordered.                                                        EDMS

20:58 T-Sheet-- Draft Copy was scanned into [employer] and attached to record.                   klr 

                                                                                             

08:56 ECG/EKG was scanned into [employer] and attached to record.                                gb  

                                                                                                  

Administered Medications:                                                                         

                                                                                             

02:46 Drug: fentaNYL (PF) 50 mcg [fentanyl (PF) 50 mcg/mL injection solution (1 mL)] Route:   kmg1

      IVP; Site: left forearm;                                                                    

05:06 Drug: fentaNYL (PF) 50 mcg [fentanyl (PF) 50 mcg/mL injection solution (1 mL)] Route:   kmg1

      IVP; Site: left forearm;                                                                    

07:57 Drug: Ondansetron 4 mg [ondansetron HCl 2 mg/mL intravenous solution (2 mL)] Route:     jc4 

      IVP; Site: left antecubital;                                                                

08:12 Drug: fentaNYL (PF) 50 mcg [fentanyl (PF) 50 mcg/mL injection solution (1 mL)] Route:   jc4 

      IVP; Site: left antecubital;                                                                

09:05 Follow up: Pain 4/10 Adult                                                              jc4 

10:26 Drug: fentaNYL (PF) 50 mcg [fentanyl (PF) 50 mcg/mL injection solution (1 mL)] Route:   dsf 

      IVP; Site: left antecubital;                                                                

11:07 Follow up: Pain 7/10 Adult; see trend VS                                                dsf 

11:11 Drug: fentaNYL (PF) 50 mcg [fentanyl (PF) 50 mcg/mL injection solution (1 mL)] Route:   dsf 

      IVP; Site: left antecubital;                                                                

11:32 Follow up: Pain 2/10 Adult; see trend VS                                                dsf 

15:00 Drug: oxyCODONE-acetaminophen 1 tabs [oxycodone-acetaminophen 5 mg-325 mg tablet (1     dsf 

      tabs)] Route: PO;                                                                           

                                                                                                  

                                                                                                  

Signatures:                                                                                       

Dispatcher [employer]                           EDMS Lundborg-Gray, Maja, MD MD   ml                                                   

Ana Jacome, RN                     RN   [id number]                                                 

Kiersten Linares, Moo                  Reg  gb                                                   

Lopresti, Jam, Unit Clerk    Unit ml3                                                  

Migdalia West RN                            RN   [id number]                                                  

Rogers Ivory,                     DO   [id number]                                                 

Siobhan Leonard RN                    RN   [id number]                                                  

Archana Zabala RN RN dsf Redder, Kathie klr McLear, Diane                                [id number]                                                 

                                                                                                  

The chart was reviewed and I authenticate all verbal orders and agree with the evaluation and 
treatment provided.Attachments:

07:24 [facility] Payment Agreement                                                                [id number]

20:58 T-Sheet-- Draft Copy                                                                    r 

                                                                                             

08:56 ECG/EKG                                                                                 gb  

                                                                                                  

**************************************************************************************************



*** Chart Complete ***
MTDD

## 2017-01-16 NOTE — DSES
DATE OF ADMISSION:  01/07/2017

DATE OF DISCHARGE:  01/09/2017

 

PRIMARY CARE PHYSICIAN: Dr. Do  at [facility]

 

CARDIOLOGY: Dr. Collier

 

NEPHROLOGIST: Dr. Matt

 

DISCHARGE DIAGNOSES:

1. Chronic chest pain, etiology uncertain at this point.

2. History of septal myomectomy for hypertrophic cardiomyopathy.

3. History of coronary artery disease with stents in the distal left anterior

descending (LAD) and diagonal arteries.

4. Left bundle branch block.

5. Mitral valve replacement with bioprosthetic mitral valve.

6. Hyperlipidemia.

7. Hypothyroidism.

8. Peripheral neuropathy.

9. Restless leg syndrome.

10. Anxiety and depression.

11. Gastroesophageal reflux disease.

12. Epigastric hernia.

13. Chronic kidney disease stage 4.

14. Morbid obesity.

15. Obstructive sleep apnea, noncompliant with continuous positive airway

pressure (CPAP).

16. History of endometrial cancer, status post hysterectomy.

17. Irritable bowel syndrome.

18. Seasonal allergies.

19. Type 2 diabetes not on any medication at this point.

20. Diastolic congestive heart failure with ejection fraction preserved 60-65%.

 

DISCHARGE MEDICATIONS:

- oxycodone/acetaminophen one tablet every eight hours as needed for pain

- pravastatin 20 mg at bedtime

- aspirin 81 mg daily

- cetirizine 10 mg daily

- clopidogrel 75 mg by mouth daily

- famotidine 20 mg by mouth twice a day

- fish oil 1000 mg by mouth daily

- Prozac 40 mg by mouth daily

- folic acid 800 mcg by mouth daily

- Lasix 20 mg by mouth daily

- isosorbide mononitrate 60 mg by mouth daily

- Synthroid 25 mcg by mouth daily

- Ativan 1 mg by mouth three times a day as needed for anxiety

- metoprolol succinate 12.5 mg by mouth twice a day

- multivitamin one tablet by mouth daily

- nitroglycerin 0.4 mg sublingual as needed for chest pain

- Lyrica 50 mg by mouth twice a day

- Ranexa 1000 mg by mouth twice a day

- Requip 2 mg by mouth at bedtime

 

HOSPITAL COURSE: This is a 51-year-old female who presented to the hospital with

chest pain, left-sided and retrosternal discomfort, which has been intermittently

going on for several months for which she had gone several times to the emergency

room at [facility]. However, was always discharged home without any

obvious diagnosis, and then came to our hospital when she was in town for some

other reason and developed chest pain. Patient does have a complicated history of

coronary artery disease status post stents with last cardiac catheterization done

in May 2016, which revealed patent stents, otherwise nonobstructive coronary

artery disease. She also carries the diagnosis of hypertrophic cardiomyopathy and

underwent septal myomectomy in [facility] in [place], complicated by

severe mitral insufficiency and underwent redo myomectomy and mitral valve

replacement at [facility] in [facility] about eight years ago. In

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

procedure and metallic mitral valve had to be replaced with a bioprosthetic

valve. At that time, she was critically ill for a long period of time and was in

the hospital for many months. This time, the chest pain has been going on for

several months, at least 3-4 months intermittently. There was no definite

precipitating factors. Patient was evaluated by cardiology in the hospital.

Patient had an echocardiogram done, which showed preserved ejection fraction.

Patient had multiple sets of cardiac enzymes which all did show a low level of

elevated troponins, all less than 0.2. As per cardiology, she would need

outpatient further investigation for coronary ischemia with abnormalities with

outpatient positron emission tomography (PET) scan for which she is to follow

with Dr. Collier in [place]. Her pain was controlled with oxycodone/acetaminophen

in the hospital, and on the day of discharge, she was symptom free with stable

vital signs and was back to functional baseline.

 

During the hospitalization, the patient had some episodes of positive orthostatic

hypotension; however, patient was asymptomatic. Patient does run blood pressure

in the low hundreds to 90s.

 

PHYSICAL EXAMINATION:

VITAL SIGNS: Temperature 98.1, pulse 67, respiratory rate 18, blood pressure

121/70, pulse oximetry 96% in room air.

GENERAL: Patient awake, alert and oriented times three, lying down in bed in no

acute distress.

HEENT: Normocephalic, atraumatic. Moist mucous membranes. Anicteric eyes.

CHEST: Clear to auscultation.

CARDIOVASCULAR: S1, S2 regular. No rub, murmur, or gallop.

ABDOMEN: Obese, soft, nontender. Bowel sounds present.

EXTREMITIES: No edema.

 

LABORATORY DATA: WBC 5.4, hemoglobin 13, platelets 126. Sodium 145, potassium

4.2, chloride 109, bicarbonate 25, BUN 32, creatinine 2.1, glucose 122, calcium

8.2.

Last set of cardiac enzymes: Troponin was 0.12. CPK was 43, CK-MB was 4.18 index.

 

 

IMAGING: CT of the abdomen showed subxiphoid epigastric hernia transmits a 6 cm

portion of the left lobe of the liver into the anterior abdominal wall, which is

unchanged from before.

 

CT of the chest showed paramedian sternotomy, cholecystectomy, mitral valve

replacement, subxiphoid epigastric hernia,

 

DISPOSITION: The patient is discharged home in stable condition.

 

DISCHARGE INSTRUCTIONS: Patient will followup with Dr. Collier in [place] in 1-2

weeks. Patient to followup with primary care provider in one week. Activity as

tolerated. Consistent carbohydrate diet.

## 2017-12-20 ENCOUNTER — HOSPITAL ENCOUNTER (OUTPATIENT)
Dept: HOSPITAL 53 - M ED | Age: 52
Setting detail: OBSERVATION
LOS: 2 days | Discharge: HOME | End: 2017-12-22
Attending: INTERNAL MEDICINE | Admitting: INTERNAL MEDICINE
Payer: MEDICARE

## 2017-12-20 VITALS — WEIGHT: 293 LBS | BODY MASS INDEX: 57.52 KG/M2 | HEIGHT: 60 IN

## 2017-12-20 VITALS — DIASTOLIC BLOOD PRESSURE: 79 MMHG | SYSTOLIC BLOOD PRESSURE: 166 MMHG

## 2017-12-20 DIAGNOSIS — K44.9: ICD-10-CM

## 2017-12-20 DIAGNOSIS — F41.9: ICD-10-CM

## 2017-12-20 DIAGNOSIS — N17.9: ICD-10-CM

## 2017-12-20 DIAGNOSIS — Z98.61: ICD-10-CM

## 2017-12-20 DIAGNOSIS — F32.9: ICD-10-CM

## 2017-12-20 DIAGNOSIS — K21.9: ICD-10-CM

## 2017-12-20 DIAGNOSIS — Z79.899: ICD-10-CM

## 2017-12-20 DIAGNOSIS — I13.0: ICD-10-CM

## 2017-12-20 DIAGNOSIS — I42.2: ICD-10-CM

## 2017-12-20 DIAGNOSIS — E03.9: ICD-10-CM

## 2017-12-20 DIAGNOSIS — E78.5: ICD-10-CM

## 2017-12-20 DIAGNOSIS — Z87.891: ICD-10-CM

## 2017-12-20 DIAGNOSIS — K58.9: ICD-10-CM

## 2017-12-20 DIAGNOSIS — I50.32: ICD-10-CM

## 2017-12-20 DIAGNOSIS — R07.89: ICD-10-CM

## 2017-12-20 DIAGNOSIS — Z79.02: ICD-10-CM

## 2017-12-20 DIAGNOSIS — Z79.51: ICD-10-CM

## 2017-12-20 DIAGNOSIS — E11.42: ICD-10-CM

## 2017-12-20 DIAGNOSIS — E11.22: ICD-10-CM

## 2017-12-20 DIAGNOSIS — G25.81: ICD-10-CM

## 2017-12-20 DIAGNOSIS — I25.10: ICD-10-CM

## 2017-12-20 DIAGNOSIS — J20.9: ICD-10-CM

## 2017-12-20 DIAGNOSIS — Z85.42: ICD-10-CM

## 2017-12-20 DIAGNOSIS — Z79.82: ICD-10-CM

## 2017-12-20 DIAGNOSIS — B34.2: Primary | ICD-10-CM

## 2017-12-20 DIAGNOSIS — I44.7: ICD-10-CM

## 2017-12-20 DIAGNOSIS — E66.01: ICD-10-CM

## 2017-12-20 DIAGNOSIS — G47.33: ICD-10-CM

## 2017-12-20 DIAGNOSIS — N18.4: ICD-10-CM

## 2017-12-20 DIAGNOSIS — I44.0: ICD-10-CM

## 2017-12-20 LAB
ALBUMIN SERPL BCG-MCNC: 2.9 GM/DL (ref 3.2–5.2)
ALBUMIN/GLOB SERPL: 0.83 {RATIO} (ref 1–1.93)
ALP SERPL-CCNC: 113 U/L (ref 45–117)
ALT SERPL W P-5'-P-CCNC: 26 U/L (ref 12–78)
ANION GAP SERPL CALC-SCNC: 8 MEQ/L (ref 8–16)
AST SERPL-CCNC: 25 U/L (ref 7–37)
BASOPHILS # BLD AUTO: 0.1 10^3/UL (ref 0–0.2)
BASOPHILS NFR BLD AUTO: 0.5 % (ref 0–1)
BILIRUB CONJ SERPL-MCNC: 0.2 MG/DL (ref 0–0.2)
BILIRUB SERPL-MCNC: 0.5 MG/DL (ref 0.2–1)
BUN SERPL-MCNC: 16 MG/DL (ref 7–18)
CALCIUM SERPL-MCNC: 8.4 MG/DL (ref 8.5–10.1)
CHLORIDE SERPL-SCNC: 107 MEQ/L (ref 98–107)
CO2 SERPL-SCNC: 27 MEQ/L (ref 21–32)
CREAT SERPL-MCNC: 1.54 MG/DL (ref 0.55–1.02)
EOSINOPHIL # BLD AUTO: 0.2 10^3/UL (ref 0–0.5)
EOSINOPHIL NFR BLD AUTO: 2.1 % (ref 0–3)
ERYTHROCYTE [DISTWIDTH] IN BLOOD BY AUTOMATED COUNT: 14.6 % (ref 11.5–14.5)
GFR SERPL CREATININE-BSD FRML MDRD: 37.5 ML/MIN/{1.73_M2} (ref 51–?)
GLUCOSE SERPL-MCNC: 213 MG/DL (ref 70–105)
IMM GRANULOCYTES NFR BLD: 0.3 % (ref 0–0)
INR PPP: 1.07
LYMPHOCYTES # BLD AUTO: 1.7 10^3/UL (ref 1.5–4.5)
LYMPHOCYTES NFR BLD AUTO: 17.6 % (ref 24–44)
MCH RBC QN AUTO: 32.2 PG (ref 27–33)
MCHC RBC AUTO-ENTMCNC: 33.8 G/DL (ref 32–36.5)
MCV RBC AUTO: 95.5 FL (ref 80–96)
MONOCYTES # BLD AUTO: 0.8 10^3/UL (ref 0–0.8)
MONOCYTES NFR BLD AUTO: 8.2 % (ref 0–5)
NEUTROPHILS # BLD AUTO: 6.8 10^3/UL (ref 1.8–7.7)
NEUTROPHILS NFR BLD AUTO: 71.3 % (ref 36–66)
NRBC BLD AUTO-RTO: 0 % (ref 0–0)
PLATELET # BLD AUTO: 172 10^3/UL (ref 150–450)
POTASSIUM SERPL-SCNC: 4.4 MEQ/L (ref 3.5–5.1)
PROT SERPL-MCNC: 6.4 GM/DL (ref 6.4–8.2)
SODIUM SERPL-SCNC: 142 MEQ/L (ref 136–145)
WBC # BLD AUTO: 9.5 10^3/UL (ref 4–10)

## 2017-12-20 PROCEDURE — 85610 PROTHROMBIN TIME: CPT

## 2017-12-20 PROCEDURE — 84443 ASSAY THYROID STIM HORMONE: CPT

## 2017-12-20 PROCEDURE — 71250 CT THORAX DX C-: CPT

## 2017-12-20 PROCEDURE — 87486 CHLMYD PNEUM DNA AMP PROBE: CPT

## 2017-12-20 PROCEDURE — 87040 BLOOD CULTURE FOR BACTERIA: CPT

## 2017-12-20 PROCEDURE — 71020: CPT

## 2017-12-20 PROCEDURE — 96374 THER/PROPH/DIAG INJ IV PUSH: CPT

## 2017-12-20 PROCEDURE — 87581 M.PNEUMON DNA AMP PROBE: CPT

## 2017-12-20 PROCEDURE — 94760 N-INVAS EAR/PLS OXIMETRY 1: CPT

## 2017-12-20 PROCEDURE — 93970 EXTREMITY STUDY: CPT

## 2017-12-20 PROCEDURE — 83880 ASSAY OF NATRIURETIC PEPTIDE: CPT

## 2017-12-20 PROCEDURE — 84484 ASSAY OF TROPONIN QUANT: CPT

## 2017-12-20 PROCEDURE — 99285 EMERGENCY DEPT VISIT HI MDM: CPT

## 2017-12-20 PROCEDURE — 87633 RESP VIRUS 12-25 TARGETS: CPT

## 2017-12-20 PROCEDURE — 82550 ASSAY OF CK (CPK): CPT

## 2017-12-20 PROCEDURE — 96372 THER/PROPH/DIAG INJ SC/IM: CPT

## 2017-12-20 PROCEDURE — 93041 RHYTHM ECG TRACING: CPT

## 2017-12-20 PROCEDURE — 85025 COMPLETE CBC W/AUTO DIFF WBC: CPT

## 2017-12-20 PROCEDURE — 87798 DETECT AGENT NOS DNA AMP: CPT

## 2017-12-20 PROCEDURE — 94640 AIRWAY INHALATION TREATMENT: CPT

## 2017-12-20 PROCEDURE — 80076 HEPATIC FUNCTION PANEL: CPT

## 2017-12-20 PROCEDURE — 71275 CT ANGIOGRAPHY CHEST: CPT

## 2017-12-20 PROCEDURE — 82553 CREATINE MB FRACTION: CPT

## 2017-12-20 PROCEDURE — 80048 BASIC METABOLIC PNL TOTAL CA: CPT

## 2017-12-20 PROCEDURE — 83036 HEMOGLOBIN GLYCOSYLATED A1C: CPT

## 2017-12-20 PROCEDURE — 87804 INFLUENZA ASSAY W/OPTIC: CPT

## 2017-12-20 PROCEDURE — 93005 ELECTROCARDIOGRAM TRACING: CPT

## 2017-12-20 PROCEDURE — 36415 COLL VENOUS BLD VENIPUNCTURE: CPT

## 2017-12-20 RX ADMIN — PREGABALIN SCH MG: 75 CAPSULE ORAL at 20:48

## 2017-12-20 RX ADMIN — ISOSORBIDE MONONITRATE SCH MG: 30 TABLET, EXTENDED RELEASE ORAL at 20:49

## 2017-12-20 RX ADMIN — ENOXAPARIN SODIUM SCH MG: 40 INJECTION SUBCUTANEOUS at 20:50

## 2017-12-20 RX ADMIN — ACETAMINOPHEN PRN MG: 325 TABLET ORAL at 23:45

## 2017-12-20 RX ADMIN — DOCUSATE SODIUM,SENNOSIDES SCH TAB: 50; 8.6 TABLET, FILM COATED ORAL at 20:50

## 2017-12-20 RX ADMIN — BENZONATATE SCH MG: 100 CAPSULE ORAL at 20:49

## 2017-12-20 RX ADMIN — RANOLAZINE SCH MG: 500 TABLET, FILM COATED, EXTENDED RELEASE ORAL at 20:53

## 2017-12-20 RX ADMIN — ALLOPURINOL SCH MG: 100 TABLET ORAL at 20:49

## 2017-12-20 NOTE — REP
Bilateral lower extremity Duplex Doppler venous ultrasound:

 

Real time compression and duplex Doppler interrogation of the bilateral lower

extremity deep venous system is performed.  Bilaterally, the common femoral,

superficial femoral and popliteal veins are fully compressible with transducer

pressure and demonstrate normal spontaneous and phasic flow, without evidence of

deep venous thrombosis.

 

Impression:

 

No evidence of deep venous thrombosis of the bilateral lower extremity femoral

popliteal venous system.

 

 

Signed by

Carltno Preston MD 12/20/2017 12:32 P

## 2017-12-20 NOTE — REP
CT ANGIOGRAM OF THE CHEST:

 

TECHNIQUE:  Axial contrast enhanced images from the thoracic inlet to the upper

abdomen using 100 mL Isovue 370 intravenous contrast material with multiplanar

reformations.

 

Once again there are chronic fibrotic changes in both lungs. No consolidation is

seen. There is minor cardiomegaly. There is no pleural or pericardial effusion.

I see no evidence of pulmonary embolism. There is no thoracic aortic aneurysm or

dissection. There is no mediastinal, hilar or chest wall lymphadenopathy. There

is no pleural or pericardial effusion. There is a small hiatal hernia. There is

an anterior abdominal wall hernia superiorly which contains a small portion of

the left lobe of the liver.

 

IMPRESSION:

 

No CT evidence of pulmonary embolism.

 

 

Signed by

Carlton Preston MD 12/21/2017 08:33 P

## 2017-12-20 NOTE — ECGEPIP
Stationary ECG Study

                           [facility] - ED

                                       

                                       Test Date:    2017

Pat Name:     CJ ROACH       Department:   

Patient ID:   [id number]                 Room:         -

Gender:       F                        Technician:   JYUSRA

:          1965               Requested By: Maddie Moser 

Order Number: [id number]     Reading MD:   Fernando Drew

                                 Measurements

Intervals                              Axis          

Rate:         70                       P:            -16

ND:           204                      QRS:          -8

QRSD:         153                      T:            25

QT:           464                                    

QTc:          502                                    

                           Interpretive Statements

SINUS RHYTHM

LEFT BUNDLE BRANCH BLOCK

SIMILAR TO 10/213/17

 

Electronically Signed On 2017 19:34:58 EST by Fernando Drew

## 2017-12-20 NOTE — REP
CT CHEST WITHOUT IV CONTRAST:

 

CT chest is performed without IV contrast.

 

Sagittal and coronal reconstruction images are performed.

 

Scattered interstitial fibrotic changes are seen bilaterally. A few tiny

calcified granulomas are seen in the right lung. There is no evidence of acute

infiltrate or consolidation. There is no pleural or pericardial effusion. There

is mild cardiomegaly. I do not see evidence of lymphadenopathy in the mediastinal

or hilar regions. There is a small hiatal hernia. In the upper abdomen, a small

portion of the left lobe of the liver herniates through the anterior abdominal

wall in the midline. The patient has had a prior cholecystectomy.

 

IMPRESSION:

 

Chronic changes as above. No evidence of acute infiltrate or pulmonary edema. No

pleural effusion. Mild cardiomegaly.

 

 

Signed by

Carlton Preston MD 12/21/2017 08:32 P

## 2017-12-20 NOTE — REP
Chest x-ray:  Two views:

 

History:  Chest pain.

 

Comparison study:  October 23, 2017.  August 19, 2017 prior chest x-ray is also

reviewed.

 

Findings:  Patient is status post prior median sternotomy.  EKG monitoring

electrodes overlie the chest.  Heart is at the upper range of normal in size but

unchanged.  No new infiltrate is seen.

 

Impression:

 

Prior sternotomy.  Borderline heart size.  Otherwise no acute disease.

 

 

Signed by

Osmel Barraza MD 12/21/2017 08:04 A

## 2017-12-21 VITALS — SYSTOLIC BLOOD PRESSURE: 133 MMHG | DIASTOLIC BLOOD PRESSURE: 76 MMHG

## 2017-12-21 VITALS — DIASTOLIC BLOOD PRESSURE: 58 MMHG | SYSTOLIC BLOOD PRESSURE: 124 MMHG

## 2017-12-21 VITALS — SYSTOLIC BLOOD PRESSURE: 135 MMHG | DIASTOLIC BLOOD PRESSURE: 61 MMHG

## 2017-12-21 LAB
ANION GAP SERPL CALC-SCNC: 6 MEQ/L (ref 8–16)
BASOPHILS # BLD AUTO: 0 10^3/UL (ref 0–0.2)
BASOPHILS NFR BLD AUTO: 0.3 % (ref 0–1)
BUN SERPL-MCNC: 17 MG/DL (ref 7–18)
CALCIUM SERPL-MCNC: 8.6 MG/DL (ref 8.5–10.1)
CHLORIDE SERPL-SCNC: 106 MEQ/L (ref 98–107)
CO2 SERPL-SCNC: 30 MEQ/L (ref 21–32)
CREAT SERPL-MCNC: 1.82 MG/DL (ref 0.55–1.02)
EOSINOPHIL # BLD AUTO: 0.3 10^3/UL (ref 0–0.5)
EOSINOPHIL NFR BLD AUTO: 2.7 % (ref 0–3)
ERYTHROCYTE [DISTWIDTH] IN BLOOD BY AUTOMATED COUNT: 14.4 % (ref 11.5–14.5)
EST. AVERAGE GLUCOSE BLD GHB EST-MCNC: 140 MG/DL (ref 60–110)
GFR SERPL CREATININE-BSD FRML MDRD: 30.9 ML/MIN/{1.73_M2} (ref 51–?)
GLUCOSE SERPL-MCNC: 175 MG/DL (ref 70–105)
IMM GRANULOCYTES NFR BLD: 0.5 % (ref 0–0)
LYMPHOCYTES # BLD AUTO: 2.1 10^3/UL (ref 1.5–4.5)
LYMPHOCYTES NFR BLD AUTO: 22.4 % (ref 24–44)
MCH RBC QN AUTO: 31.6 PG (ref 27–33)
MCHC RBC AUTO-ENTMCNC: 32.5 G/DL (ref 32–36.5)
MCV RBC AUTO: 97.4 FL (ref 80–96)
MONOCYTES # BLD AUTO: 0.4 10^3/UL (ref 0–0.8)
MONOCYTES NFR BLD AUTO: 4 % (ref 0–5)
NEUTROPHILS # BLD AUTO: 6.7 10^3/UL (ref 1.8–7.7)
NEUTROPHILS NFR BLD AUTO: 70.1 % (ref 36–66)
NRBC BLD AUTO-RTO: 0 % (ref 0–0)
PLATELET # BLD AUTO: 178 10^3/UL (ref 150–450)
POTASSIUM SERPL-SCNC: 4.3 MEQ/L (ref 3.5–5.1)
SODIUM SERPL-SCNC: 142 MEQ/L (ref 136–145)
WBC # BLD AUTO: 9.5 10^3/UL (ref 4–10)

## 2017-12-21 RX ADMIN — METOPROLOL SUCCINATE SCH MG: 25 TABLET, EXTENDED RELEASE ORAL at 10:32

## 2017-12-21 RX ADMIN — ACETAMINOPHEN PRN MG: 325 TABLET ORAL at 10:43

## 2017-12-21 RX ADMIN — LEVOTHYROXINE SODIUM SCH MCG: 25 TABLET ORAL at 05:44

## 2017-12-21 RX ADMIN — ACETAMINOPHEN PRN MG: 325 TABLET ORAL at 18:55

## 2017-12-21 RX ADMIN — ALLOPURINOL SCH MG: 100 TABLET ORAL at 21:09

## 2017-12-21 RX ADMIN — ASPIRIN SCH MG: 81 TABLET ORAL at 10:32

## 2017-12-21 RX ADMIN — DOCUSATE SODIUM,SENNOSIDES SCH TAB: 50; 8.6 TABLET, FILM COATED ORAL at 21:00

## 2017-12-21 RX ADMIN — ALBUTEROL SULFATE SCH MG: 2.5 SOLUTION RESPIRATORY (INHALATION) at 06:08

## 2017-12-21 RX ADMIN — ALBUTEROL SULFATE SCH MG: 2.5 SOLUTION RESPIRATORY (INHALATION) at 00:12

## 2017-12-21 RX ADMIN — AZITHROMYCIN MONOHYDRATE SCH MG: 250 TABLET ORAL at 10:33

## 2017-12-21 RX ADMIN — BENZONATATE SCH MG: 100 CAPSULE ORAL at 21:09

## 2017-12-21 RX ADMIN — ISOSORBIDE MONONITRATE SCH MG: 30 TABLET, EXTENDED RELEASE ORAL at 21:10

## 2017-12-21 RX ADMIN — ENOXAPARIN SODIUM SCH MG: 40 INJECTION SUBCUTANEOUS at 21:10

## 2017-12-21 RX ADMIN — MULTIPLE VITAMINS W/ MINERALS TAB SCH TAB: TAB at 10:34

## 2017-12-21 RX ADMIN — PANTOPRAZOLE SODIUM SCH MG: 40 TABLET, DELAYED RELEASE ORAL at 10:31

## 2017-12-21 RX ADMIN — PREGABALIN SCH MG: 75 CAPSULE ORAL at 21:09

## 2017-12-21 RX ADMIN — DOCUSATE SODIUM,SENNOSIDES SCH TAB: 50; 8.6 TABLET, FILM COATED ORAL at 10:34

## 2017-12-21 RX ADMIN — PREGABALIN SCH MG: 75 CAPSULE ORAL at 10:34

## 2017-12-21 RX ADMIN — RANOLAZINE SCH MG: 500 TABLET, FILM COATED, EXTENDED RELEASE ORAL at 10:31

## 2017-12-21 RX ADMIN — CLOPIDOGREL BISULFATE SCH MG: 75 TABLET ORAL at 10:34

## 2017-12-21 RX ADMIN — RANOLAZINE SCH MG: 500 TABLET, FILM COATED, EXTENDED RELEASE ORAL at 21:09

## 2017-12-21 RX ADMIN — ALBUTEROL SULFATE SCH MG: 2.5 SOLUTION RESPIRATORY (INHALATION) at 15:47

## 2017-12-21 RX ADMIN — BENZONATATE SCH MG: 100 CAPSULE ORAL at 17:30

## 2017-12-21 RX ADMIN — ACETAMINOPHEN PRN MG: 325 TABLET ORAL at 23:43

## 2017-12-21 RX ADMIN — INSULIN LISPRO SCH UNITS: 100 INJECTION, SOLUTION INTRAVENOUS; SUBCUTANEOUS at 17:30

## 2017-12-21 RX ADMIN — BENZONATATE SCH MG: 100 CAPSULE ORAL at 10:33

## 2017-12-21 RX ADMIN — ALBUTEROL SULFATE SCH MG: 2.5 SOLUTION RESPIRATORY (INHALATION) at 23:29

## 2017-12-21 RX ADMIN — INSULIN LISPRO SCH UNITS: 100 INJECTION, SOLUTION INTRAVENOUS; SUBCUTANEOUS at 13:48

## 2017-12-21 NOTE — IPN
DATE OF SERVICE:  12/21/2017

 

SUBJECTIVE:  Patient is seen and examined in the room today.  Patient continues

to have complaint about atypical chest pain.  Patient is continuing to complain

about dry cough.  No sputum production.  Patient states she has been having this

dry cough for the past approximately 2 weeks.  It is not getting better.  Denies

any recent sick contact.

 

OBJECTIVE:

Vital signs:  Temperature is 97.5, pulse is 98, respirations 21, blood pressure

is 124/58, pulse oximetry 91% on room air.

General:  Morbidly obese, no sign of acute distress.  Alert and oriented times

three.

HEENT:  Normocephalic, atraumatic.  Extraocular motor grossly intact.

Cardiovascular:  Positive S1, S2.  Distant heart sounds.

Lungs:  Clear to auscultation bilaterally.  No wheezes or rhonchi.

Abdomen:  Morbidly obese, soft, nontender, nondistended.

Extremities:  No edema.  No cyanosis.

 

LABORATORY DATA:  WBC is 9.5, hemoglobin 13.6, hematocrit 41.9, platelet count is

178.  Sodium is 142, potassium 4.3, chloride 106, carbon dioxide 30, BUN 17,

creatinine 1.82.  Glomerular filtration rate is 30.9.  Fasting glucose 175,

calcium 8.6.

 

ASSESSMENT AND PLAN:

1.  Coronavirus.  Continue medical management.  Currently patient is afebrile.

Vitals stable.  No elevated white count.

 

2.  Acute on chronic kidney injury.  On admission, patient had creatinine of 1.5,

this is above her baseline with a glomerular filtration rate 37.5.  This morning,

creatinine increased to 1.8 with a glomerular filtration rate 30.9.  Patient did

have CT angiogram of the chest.  There is a possibility that CT contrast causing

the acute renal injury.  Recommend increased oral intake as tolerated.  Continue

to monitor with renal functions.

 

3.  Atypical chest pain.  Patient had a diagnostic workup performed previously

but there is no significant cause for the chest pain.  Patient also has a history

of elevated troponin.  The two sets of troponin is around 0.16.  CT angiogram was

performed yesterday and showed there is no evidence of pulmonary embolus (PE).

 

4.  Hypertrophic obstructive cardiomyopathy status post two septal myectomies and

also mitral valve replacement.  Per record, initially patient had mechanical

valve.  Later patient had endocarditis, therefore replaced with a bioprosthetic

in 2013.

 

5.  Coronary artery disease status post stent times four, last one in 2016.

Grade II diastolic dysfunction.  Echo was performed 10/23/2017.  Will watch

patient's input and output and daily weight.  Will be cautious regarding to the

fluid usage.  Currently patient is on aspirin, Plavix.

 

6.  Hypertensive heart disease.

 

7.  Hypothyroidism on Synthroid.

 

8.  Restless leg syndrome, on Requip.

 

9.  Type 2 diabetes.  On review of patient's medication, patient is not on any

diabetic medications.  While patient is here, patient will be on sliding scale.

Patient is on consistent carbohydrate diet.

 

10.  Endometrial cancer status post hysterectomy.

 

11.  Morbid obesity.

 

12.  Anxiety and depression.  On Prozac, BuSpar.

 

13.  History of first degree AV block with left bundle branch block.

 

14.  Obstructive sleep apnea (KELIN).

 

15.  History of irritable bowel syndrome.

 

16.  Deep venous thrombosis (DVT) prophylaxis with Lovenox.

## 2017-12-22 VITALS — DIASTOLIC BLOOD PRESSURE: 76 MMHG | SYSTOLIC BLOOD PRESSURE: 127 MMHG

## 2017-12-22 VITALS — SYSTOLIC BLOOD PRESSURE: 116 MMHG | DIASTOLIC BLOOD PRESSURE: 59 MMHG

## 2017-12-22 VITALS — SYSTOLIC BLOOD PRESSURE: 129 MMHG | DIASTOLIC BLOOD PRESSURE: 73 MMHG

## 2017-12-22 LAB
ANION GAP SERPL CALC-SCNC: 8 MEQ/L (ref 8–16)
BASOPHILS # BLD AUTO: 0 10^3/UL (ref 0–0.2)
BASOPHILS NFR BLD AUTO: 0.5 % (ref 0–1)
BUN SERPL-MCNC: 19 MG/DL (ref 7–18)
CALCIUM SERPL-MCNC: 8.3 MG/DL (ref 8.5–10.1)
CHLORIDE SERPL-SCNC: 107 MEQ/L (ref 98–107)
CO2 SERPL-SCNC: 26 MEQ/L (ref 21–32)
CREAT SERPL-MCNC: 1.57 MG/DL (ref 0.55–1.02)
EOSINOPHIL # BLD AUTO: 0.3 10^3/UL (ref 0–0.5)
EOSINOPHIL NFR BLD AUTO: 5.1 % (ref 0–3)
ERYTHROCYTE [DISTWIDTH] IN BLOOD BY AUTOMATED COUNT: 14.2 % (ref 11.5–14.5)
GFR SERPL CREATININE-BSD FRML MDRD: 36.7 ML/MIN/{1.73_M2} (ref 51–?)
GLUCOSE SERPL-MCNC: 124 MG/DL (ref 70–105)
IMM GRANULOCYTES NFR BLD: 1 % (ref 0–0)
LYMPHOCYTES # BLD AUTO: 1.6 10^3/UL (ref 1.5–4.5)
LYMPHOCYTES NFR BLD AUTO: 26.3 % (ref 24–44)
MCH RBC QN AUTO: 31.7 PG (ref 27–33)
MCHC RBC AUTO-ENTMCNC: 33.2 G/DL (ref 32–36.5)
MCV RBC AUTO: 95.3 FL (ref 80–96)
MONOCYTES # BLD AUTO: 0.5 10^3/UL (ref 0–0.8)
MONOCYTES NFR BLD AUTO: 7.7 % (ref 0–5)
NEUTROPHILS # BLD AUTO: 3.7 10^3/UL (ref 1.8–7.7)
NEUTROPHILS NFR BLD AUTO: 59.4 % (ref 36–66)
NRBC BLD AUTO-RTO: 0 % (ref 0–0)
PLATELET # BLD AUTO: 161 10^3/UL (ref 150–450)
POTASSIUM SERPL-SCNC: 4.1 MEQ/L (ref 3.5–5.1)
SODIUM SERPL-SCNC: 141 MEQ/L (ref 136–145)
WBC # BLD AUTO: 6.2 10^3/UL (ref 4–10)

## 2017-12-22 RX ADMIN — DOCUSATE SODIUM,SENNOSIDES SCH TAB: 50; 8.6 TABLET, FILM COATED ORAL at 09:00

## 2017-12-22 RX ADMIN — INSULIN LISPRO SCH UNITS: 100 INJECTION, SOLUTION INTRAVENOUS; SUBCUTANEOUS at 09:18

## 2017-12-22 RX ADMIN — LEVOTHYROXINE SODIUM SCH MCG: 25 TABLET ORAL at 05:39

## 2017-12-22 RX ADMIN — AZITHROMYCIN MONOHYDRATE SCH MG: 250 TABLET ORAL at 09:14

## 2017-12-22 RX ADMIN — PREGABALIN SCH MG: 75 CAPSULE ORAL at 09:17

## 2017-12-22 RX ADMIN — ALBUTEROL SULFATE SCH MG: 2.5 SOLUTION RESPIRATORY (INHALATION) at 15:10

## 2017-12-22 RX ADMIN — INSULIN LISPRO SCH UNITS: 100 INJECTION, SOLUTION INTRAVENOUS; SUBCUTANEOUS at 12:26

## 2017-12-22 RX ADMIN — BENZONATATE SCH MG: 100 CAPSULE ORAL at 09:14

## 2017-12-22 RX ADMIN — PANTOPRAZOLE SODIUM SCH MG: 40 TABLET, DELAYED RELEASE ORAL at 09:14

## 2017-12-22 RX ADMIN — RANOLAZINE SCH MG: 500 TABLET, FILM COATED, EXTENDED RELEASE ORAL at 10:52

## 2017-12-22 RX ADMIN — ALBUTEROL SULFATE SCH MG: 2.5 SOLUTION RESPIRATORY (INHALATION) at 06:30

## 2017-12-22 RX ADMIN — MULTIPLE VITAMINS W/ MINERALS TAB SCH TAB: TAB at 09:15

## 2017-12-22 RX ADMIN — CLOPIDOGREL BISULFATE SCH MG: 75 TABLET ORAL at 09:14

## 2017-12-22 RX ADMIN — ASPIRIN SCH MG: 81 TABLET ORAL at 09:15

## 2017-12-22 RX ADMIN — METOPROLOL SUCCINATE SCH MG: 25 TABLET, EXTENDED RELEASE ORAL at 09:16

## 2017-12-22 NOTE — DSES
DATE OF ADMISSION:   12/20/2017

DATE OF DISCHARGE:

 

CONSULTANTS:  None.

 

PROCEDURES:  None.

 

DISCHARGE DIAGNOSES:

1.  Coronavirus.

2.  Acute on chronic kidney injury.

3.  Atypical chest pain.

4.  Hypertrophic cardiomyopathy, status post ____________________ myomectomy and

mitral valve replacement.

5.  Coronary artery disease, status post stent times four.

6.  Hypertensive heart disease.

7.  Hypothyroidism.

8.  Restless leg syndrome.

9.  Type 2 diabetes.

10.  Endometrial cancer, status post hysterectomy.

11.  Morbid obesity.

12.  Anxiety/depression.

13.  History of first degree AV block with left bundle branch block.

14.  Obstructive sleep apnea.

15.  Irritable bowel syndrome.

 

HOSPITALIZATION COURSE:  The patient is 52-year-old female who presented to

[facility] on 12/20/2017 for persistent atypical chest pain.   The

patient also complained about persistent cough.  Cultures obtained and the

patient was monitored on telemetry.  During initial workup, the patient had a CT

angiogram of the chest, which later came back negative for pulmonary embolism.

In the first 24 hours, there is acute decrease in renal function, highly

suggestive of contrast induced acute on chronic kidney injury.  The patient is

continued on medical management.  The patient's respiratory panel later came back

positive for coronavirus.  Medications were given for symptomatic control.  On

12/22/2017, the patient was improved with her symptoms and determined to be

medically stable for discharge.

 

VITAL SIGNS:  On the day of discharge, temperature 97.8, pulse 96, respirations

16, blood pressure was 116/59, pulse oxygen 98% on room air.

 

LABORATORY DATA:  On the day of discharge, WBC is 6.2, hemoglobin 12.1,

hematocrit 36.4, platelet count is 161.  Sodium is 141, potassium 4.1, chloride

107, carbon dioxide 26, BUN 19, creatinine 1.57, GFR 36.7, fasting glucose 124,

A1/c is 6.5, calcium is 8.3.

 

Microbiology:  Blood cultures from 12/20/2017 negative after 24 hours times two

sets.  Respiratory panel is positive for coronavirus.

 

Imaging studies:  Chest x-ray on 12/20/2017 showed prior sternotomy.  Borderline

heart size.

 

Bilateral lower extremity Doppler on 12/20/2017 showed no evidence of deep vein

thrombosis (DVT) of the bilateral lower extremities.

 

CT of the chest without contrast on 12/20/2017 showed chronic changes.  No

evidence of acute infiltrate or pulmonary edema.  No pleural effusions.  Mild

cardiomegaly.

 

CT angio of the chest on 12/20/2017 showed no CT evidence of pulmonary embolism.

 

 

DISCHARGE MEDICATIONS:

- Tessalon Perles 100 mg by mouth three times a day for 7 days

- albuterol 2.5 mg inhalation four times a day as needed

- allopurinol 100 mg by mouth at night

- aspirin 81 mg by mouth daily

- buspirone 10 mg by mouth twice a day

- Plavix 75 mg by mouth daily

- Prozac 40 mg by mouth daily

- Lasix 20 mg by mouth twice a day

- isosorbide mononitrate 60 mg by mouth at night

- Synthroid 25 mcg by mouth daily

- Ativan 1 mg by mouth three times a day as needed

- metoprolol succinate 50 mg by mouth daily

- multivitamin one tablet by mouth daily

- nitroglycerin 0.4 mg sublingual as needed

- pantoprazole 40 mg by mouth daily

- potassium chloride 10 mg by mouth daily

- Lyrica 75 mg by mouth twice a day

- Ranexa 1000 mg by mouth twice a day

- Requip 2 mg by mouth at night

 

DISCHARGE INSTRUCTIONS:   Discontinue line.  Discharge home.  Activity as

tolerated.  Low salt, consistent carbohydrate diet as tolerated.  The patient

should followup with primary care provider in 7 to 10 days.

 

Discharge time was greater than 30 minutes.

## 2017-12-22 NOTE — HPE
DATE OF ADMISSION:  12/20/2017

 

PRIMARY CARE PHYSICIAN:  Dr. Zambrano at [place].

 

CARDIOLOGIST:  Dr. Concepcion

 

CHIEF COMPLAINTS:

1.  Dry hacking cough for the past 1 week.

2.  Increased chest pain for the same duration, along with increased shortness of

breathing.

 

PAST MEDICAL HISTORY:

1. Chronic atypical chest pain of uncertain etiology, controlled by narcotics.

2.  History of hypertrophic cardiomyopathy status post septal myectomy times

two.

3.  History of coronary artery disease with stents in the distal left anterior

descending and the diagonal arteries, mitral valve replacement times two

initially had metallic valve, which got thrombosed, so was replaced by a

bioprosthetic valve.

4.  Left bundle branch block.

5.  Morbid obesity.

6.  Obstructive sleep apnea (KELIN), noncompliant with continuous positive airway

pressure (CPAP).

7.  Seasonal allergies.

8.  Diastolic congestive heart failure with preserved ejection fraction of

60-65%.

9.  Diabetes.

10.  Hyperlipidemia.

11.  Hypertension.

12.  Hypothyroid.

13.  Peripheral neuropathy.

14.  Restless leg syndrome.

15.  Gastroesophageal reflux disease (GERD).

16.  Anxiety and depression.

17.  Epigastric hernia.

18.  Chronic kidney disease (CKD) stage IV.

19.  History of endometrial cancer status post hysterectomy.

20.  Chronic elevation of troponins.

 

HISTORY OF PRESENT ILLNESS:  This is a 52-year-old female with multiple medical

problems as stated above presented to the hospital with 6-7 day history of dry

hacking coughing episodes with some sore throat.  Initially patient had some

nasal congestion, which has resolved.  She also has been having increased chest

pain with coughing, and also, she has been feeling more winded than her usual.

So she came to the emergency room for evaluation of this intractable cough and

chest pain.

 

In the emergency room patient had EKGs done, which showed left bundle branch

block, which is chronic.

 

She had lab work done, which showed elevation of troponin to a level of 0.16,

which is not abnormal for her.  She has always had negative troponins in the past

also.

 

Patient also had a CT angiography of the chest done and was negative for

pulmonary embolism.  Patient did have a proBNP, which was mildly elevated at 958

without any signs of clinical fluid overload.

 

She also had Deep venous thrombosis (DVT) scan done, which is negative for any

DVT.

 

She also had CT chest done, which the report is still pending, however did not

show any acute cardiopulmonary events.

 

However, the patient continued to complain of chest pain and coughing episodes,

and she felt she was not good enough to go home and manage by herself.   So she

is being admitted to the hospitalist service for observation.

 

Patient also did complaint nausea going on for the past few days, but no

vomiting.  She denied any diarrhea, any abdominal pain.  Patient was also

concerned because she had received contrast.

 

Patient used to follow with Dr. Saals; however has missed a few appointments so

they have not been seeing her anymore.

 

Patient is being admitted to the hospitalist service for tracheobronchitis and

atypical chest pain.

 

PAST SURGICAL HISTORY:

1.  Mitral valve replaced times two.

2.  Septal myectomy times two.

3.  Adenoidectomy and tonsillectomy.

4.  Hysterectomy for endometrial cancer in 2013.

5.  Cardiac stent times two, last one in 2016.

6.  History of tracheostomy.

7.  Posterior tibial tendon transplant.

8.  Hemorrhoid repair.

 

SOCIAL HISTORY: Patient is an ex-smoker.  Quit smoking 8 years ago.  Used to

smoke about one pack per day for 20 years.  Denies any alcohol abuse or

recreational drug use.

 

FAMILY HISTORY:  Unknown, patient was adopted.

 

HOME MEDICATIONS:

- albuterol nebulizer solution one Respules four times a day as needed, shortness

of breath

- allopurinol 100 mg at bedtime

- aspirin 81 mg daily

- buspirone 10 mg by mouth twice a day

- Plavix 75 mg by mouth daily

- fish oil 1000 mg by mouth daily

- Prozac 40 mg by mouth daily

- folic acid 800 mcg by mouth daily

- Lasix 20 mg by mouth twice a day

- isosorbide mononitrate 60 mg by mouth at bedtime

- Synthroid 25 mcg by mouth daily

- Ativan 1 mg by mouth three times a day as needed, anxiety

- metoprolol succinate 50 mg by mouth daily

- multivitamin one tablet by mouth daily

- nitroglycerin 0.4 mg sublingual as needed, chest pain

- pantoprazole 40 mg by mouth daily

- potassium chloride 10 mEq by mouth daily

- Lyrica 75 mg by mouth twice a day

- Ranexa 1000 mg by mouth twice a day

- Requip 2 mg by mouth at bedtime

 

REVIEW OF SYSTEMS:  All 10-point review of systems are negative except as

mentioned in HPI.

 

PHYSICAL EXAMINATION:

VITAL SIGNS:  Temperature 98.4, pulse 64, blood pressure 158/78, pulse oximetry

97% in room air.

GENERAL:  Patient awake, alert, oriented times three, sitting up in bed, in no

acute distress.

HEENT:  Normocephalic, atraumatic.  Moist mucous membrane.  Anicteric eyes.

CHEST:  Overall very poor air entry.  Otherwise, clear to auscultation.

CARDIOVASCULAR:  S1, S2, regular.  No rub, murmur or gallop.

ABDOMEN:  Obese, soft, nontender.  Bowel sounds present.

EXTREMITIES:  No edema.

 

LABORATORY DATA:  WBC 9.5.  Hemoglobin 13.5.  Platelets 172.  Sodium 142.

Potassium 4.4.  Chloride 107.  Bicarbonate 27.  BUN 16.  Creatinine 1.54.

Glucose 213.  Calcium 8.4.  Liver function tests are normal.  Troponin 0.16,

proBNP 958.  Albumin 2.9.  TSH 2.9.  Coagulation studies are normal.

 

ASSESSMENT:  This is 52-year-old female admitted for atypical chest pain and

tracheobronchitis.

 

PLAN:

1.  For tracheobronchitis, will start the patient on dexamethasone and cough

syrup and benzonatate capsules.   Will also give azithromycin.  Patient's CT

chest and chest x-ray has been reviewed.

 

2.  Atypical chest pain.  This is a chronic issues.  It has been evaluated

several times by cardiology in the past and follows with cardiologist.  No

definite etiology has been found.  Patient also has chronic elevation of

troponins, which is probably related to her CKD and other chronic cardiac

problems.  Her troponin today remains at her baseline elevated state.  Patient's

EKG did not show any new changes.  Patient also had a CT angiography done, which

was negative for pulmonary embolism (PE).  So this atypical chest pain is

noncardiac in nature and probably is musculoskeletal, and at this point has been

aggravated because of her increased episodes and bouts of coughing.

 

3.  Chronic kidney disease, stage III-IV.  Her creatinine at present appears to

be better than her baseline.  Unsure whether there is a component of fluid

overload or not; though, clinically, she does not appear to be fluid overloaded.

However, because of body habitus, it is very difficult to accurately  her.

Will continue her on her home dose of diuretics at present.  Patient did receive

intravenous (IV) contrast today for her CT angiography of the chest.  So will

monitor her renal function over the next 24 hours as patient may have developed

some contrast nephropathy.  Patient did get 1 liter of IV fluids prior to

contrast.

 

4.  Shortness of breath.  At present, patient looks comfortable.  Saturating at

97% in room air.  Patient does have multiple medical comorbidities, which can

contribute to her shortness of breath, like morbid obesity, chronic cardiac

issues, history of congestive heart failure.  Will monitor at present.

 

5.  Hypothyroid.  Will continue with Synthroid.

 

6.  Anxiety and depression.  Will continue with buspirone, Ativan as needed.

 

7.  Restless leg syndrome.  Will continue with Requip.

 

8.  Neuropathy.  Will continue with Lyrica.

 

9.  Coronary artery disease with history of stents.  Will continue with aspirin

and Plavix.  Will continue with beta-blocker, isosorbide mononitrate, Ranexa.

Patient is not on statins at home.  So will not start it present.

 

10.  History of gastroesophageal reflux disease and epigastric hernia.  Will

continue with pantoprazole.

 

11.  Obstructive sleep apnea (KELIN).  Will continue use of home CPAP.

 

12.  Morbid obesity complicating care.

 

13.  History of mitral valve replacement times two, and history of hypertrophic

obstructive cardiomyopathy with septal myomectomy times two.  No issues at this

point.

 

14.  Deep venous thrombosis prophylaxis has been ordered.

 

15.  Gastrointestinal (GI) prophylaxis has been ordered.

## 2017-12-26 ENCOUNTER — HOSPITAL ENCOUNTER (EMERGENCY)
Dept: HOSPITAL 53 - M ED | Age: 52
LOS: 1 days | Discharge: HOME | End: 2017-12-27
Payer: MEDICARE

## 2017-12-26 DIAGNOSIS — Z79.890: ICD-10-CM

## 2017-12-26 DIAGNOSIS — E66.01: ICD-10-CM

## 2017-12-26 DIAGNOSIS — Z79.899: ICD-10-CM

## 2017-12-26 DIAGNOSIS — N28.9: ICD-10-CM

## 2017-12-26 DIAGNOSIS — Z79.01: ICD-10-CM

## 2017-12-26 DIAGNOSIS — Z88.5: ICD-10-CM

## 2017-12-26 DIAGNOSIS — R07.89: Primary | ICD-10-CM

## 2017-12-26 DIAGNOSIS — Z88.8: ICD-10-CM

## 2017-12-26 DIAGNOSIS — J44.9: ICD-10-CM

## 2017-12-26 DIAGNOSIS — I50.9: ICD-10-CM

## 2017-12-26 DIAGNOSIS — Z95.5: ICD-10-CM

## 2017-12-26 DIAGNOSIS — K21.9: ICD-10-CM

## 2017-12-26 DIAGNOSIS — R79.89: ICD-10-CM

## 2017-12-26 DIAGNOSIS — Z79.82: ICD-10-CM

## 2017-12-26 DIAGNOSIS — I44.7: ICD-10-CM

## 2017-12-26 DIAGNOSIS — I25.10: ICD-10-CM

## 2017-12-26 DIAGNOSIS — Z88.1: ICD-10-CM

## 2017-12-26 LAB
BASO #: 0.1 10^3/UL (ref 0–0.2)
BASO %: 0.7 % (ref 0–1)
EOS #: 0.1 10^3/UL (ref 0–0.5)
EOSINOPHIL NFR BLD AUTO: 1.3 % (ref 0–3)
IMMATURE GRANULOCYTE #: 0.2 10^3/UL (ref 0–0)
IMMATURE GRANULOCYTE %: 2 % (ref 0–0)
LYMPH #: 2.2 10^3/UL (ref 1.5–4.5)
LYMPH %: 21.8 % (ref 24–44)
MEAN CORPUSCULAR HEMOGLOBIN: 31.6 PG (ref 27–33)
MEAN CORPUSCULAR HGB CONC: 33.1 G/DL (ref 32–36.5)
MEAN CORPUSCULAR VOLUME: 95.6 FL (ref 80–96)
MONO #: 0.9 10^3/UL (ref 0–0.8)
MONO %: 9.1 % (ref 0–5)
NEUTROPHILS #: 6.6 10^3/UL (ref 1.8–7.7)
NEUTROPHILS %: 65.1 % (ref 36–66)
NRBC BLD AUTO-RTO: 0 % (ref 0–0)
PLATELET COUNT, AUTOMATED: 218 10^3/UL (ref 150–450)
RED CELL DISTRIBUTION WIDTH: 14.2 % (ref 11.5–14.5)
WHITE BLOOD COUNT: 10.2 10^3/UL (ref 4–10)

## 2017-12-26 RX ADMIN — ASPIRIN 325 MG ORAL TABLET 1 MG: 325 PILL ORAL at 23:30

## 2017-12-27 LAB
ANION GAP: 6 MEQ/L (ref 8–16)
BLOOD UREA NITROGEN: 19 MG/DL (ref 7–18)
CALCIUM LEVEL: 9 MG/DL (ref 8.5–10.1)
CARBON DIOXIDE LEVEL: 27 MEQ/L (ref 21–32)
CHLORIDE LEVEL: 106 MEQ/L (ref 98–107)
CREATININE FOR GFR: 1.55 MG/DL (ref 0.55–1.02)
GFR SERPL CREATININE-BSD FRML MDRD: 37.2 ML/MIN/{1.73_M2} (ref 51–?)
GLUCOSE, FASTING: 126 MG/DL (ref 70–105)
INR: 1.18
POTASSIUM SERUM: 4.1 MEQ/L (ref 3.5–5.1)
SODIUM LEVEL: 139 MEQ/L (ref 136–145)

## 2017-12-27 RX ADMIN — SODIUM CHLORIDE 1 MLS/HR: 9 INJECTION, SOLUTION INTRAVENOUS at 00:30

## 2018-02-06 ENCOUNTER — HOSPITAL ENCOUNTER (EMERGENCY)
Dept: HOSPITAL 53 - M ED | Age: 53
Discharge: ANOTHER HOSPITAL | End: 2018-02-06
Payer: MEDICARE

## 2018-02-06 DIAGNOSIS — I10: ICD-10-CM

## 2018-02-06 DIAGNOSIS — E78.5: ICD-10-CM

## 2018-02-06 DIAGNOSIS — Z82.49: ICD-10-CM

## 2018-02-06 DIAGNOSIS — Z88.5: ICD-10-CM

## 2018-02-06 DIAGNOSIS — I25.10: ICD-10-CM

## 2018-02-06 DIAGNOSIS — R07.89: Primary | ICD-10-CM

## 2018-02-06 DIAGNOSIS — Z79.890: ICD-10-CM

## 2018-02-06 DIAGNOSIS — Z98.890: ICD-10-CM

## 2018-02-06 DIAGNOSIS — Z79.899: ICD-10-CM

## 2018-02-06 DIAGNOSIS — Z79.01: ICD-10-CM

## 2018-02-06 DIAGNOSIS — Z79.82: ICD-10-CM

## 2018-02-06 DIAGNOSIS — I25.2: ICD-10-CM

## 2018-02-06 DIAGNOSIS — R06.02: ICD-10-CM

## 2018-02-06 DIAGNOSIS — Z88.1: ICD-10-CM

## 2018-02-06 DIAGNOSIS — Z88.8: ICD-10-CM

## 2018-02-06 LAB
ANION GAP: 7 MEQ/L (ref 8–16)
BASO #: 0.1 10^3/UL (ref 0–0.2)
BASO %: 0.6 % (ref 0–1)
BLOOD UREA NITROGEN: 19 MG/DL (ref 7–18)
CALCIUM LEVEL: 8.7 MG/DL (ref 8.5–10.1)
CARBON DIOXIDE LEVEL: 29 MEQ/L (ref 21–32)
CHLORIDE LEVEL: 105 MEQ/L (ref 98–107)
CK MB CFR.DF SERPL CALC: 1.94
CK SERPL-CCNC: 67 U/L (ref 26–192)
CK-MB VALUE MASS: 1.3 NG/ML (ref 0–3.6)
CREATININE FOR GFR: 1.69 MG/DL (ref 0.55–1.3)
EOS #: 0.1 10^3/UL (ref 0–0.5)
EOSINOPHIL NFR BLD AUTO: 1.4 % (ref 0–3)
GFR SERPL CREATININE-BSD FRML MDRD: 33.7 ML/MIN/{1.73_M2} (ref 51–?)
GLUCOSE, FASTING: 167 MG/DL (ref 70–100)
HEMATOCRIT: 42 % (ref 36–47)
HEMOGLOBIN: 13.9 G/DL (ref 12–16)
IMMATURE GRANULOCYTE #: 0.1 10^3/UL (ref 0–0)
IMMATURE GRANULOCYTE %: 0.6 % (ref 0–0)
LYMPH #: 2.1 10^3/UL (ref 1.5–4.5)
LYMPH %: 23.8 % (ref 24–44)
MEAN CORPUSCULAR HEMOGLOBIN: 32 PG (ref 27–33)
MEAN CORPUSCULAR HGB CONC: 33.1 G/DL (ref 32–36.5)
MEAN CORPUSCULAR VOLUME: 96.8 FL (ref 80–96)
MONO #: 0.7 10^3/UL (ref 0–0.8)
MONO %: 8.1 % (ref 0–5)
NEUTROPHILS #: 5.8 10^3/UL (ref 1.8–7.7)
NEUTROPHILS %: 65.5 % (ref 36–66)
NRBC BLD AUTO-RTO: 0 % (ref 0–0)
NT-PRO BNP: 294 PG/ML (ref ?–125)
PLATELET COUNT, AUTOMATED: 164 10^3/UL (ref 150–450)
POTASSIUM SERUM: 4.2 MEQ/L (ref 3.5–5.1)
RED BLOOD COUNT: 4.34 10^6/UL (ref 4–5.4)
RED CELL DISTRIBUTION WIDTH: 13.7 % (ref 11.5–14.5)
SODIUM LEVEL: 141 MEQ/L (ref 136–145)
TROPONIN I: 0.18 NG/ML (ref ?–0.1)
WHITE BLOOD COUNT: 8.8 10^3/UL (ref 4–10)

## 2018-02-06 RX ADMIN — NITROGLYCERIN 1 MG: 0.4 TABLET SUBLINGUAL at 20:23

## 2018-02-06 RX ADMIN — NITROGLYCERIN 1 MG: 0.4 TABLET SUBLINGUAL at 20:18

## 2018-02-06 RX ADMIN — FENTANYL CITRATE 1 MCG: 50 INJECTION, SOLUTION INTRAMUSCULAR; INTRAVENOUS at 22:30

## 2018-02-06 RX ADMIN — NITROGLYCERIN 1 MG: 0.4 TABLET SUBLINGUAL at 20:28

## 2018-02-06 RX ADMIN — ASPIRIN 81 MG CHEWABLE TABLET 1 MG: 81 TABLET CHEWABLE at 20:00

## 2018-02-06 RX ADMIN — FENTANYL CITRATE 1 MCG: 50 INJECTION, SOLUTION INTRAMUSCULAR; INTRAVENOUS at 21:56

## 2018-02-06 RX ADMIN — FENTANYL CITRATE 1 MCG: 50 INJECTION, SOLUTION INTRAMUSCULAR; INTRAVENOUS at 20:43

## 2018-02-06 RX ADMIN — FENTANYL CITRATE 1 MCG: 50 INJECTION, SOLUTION INTRAMUSCULAR; INTRAVENOUS at 22:53
